# Patient Record
Sex: MALE | Race: WHITE | NOT HISPANIC OR LATINO | Employment: OTHER | URBAN - METROPOLITAN AREA
[De-identification: names, ages, dates, MRNs, and addresses within clinical notes are randomized per-mention and may not be internally consistent; named-entity substitution may affect disease eponyms.]

---

## 2017-04-20 ENCOUNTER — GENERIC CONVERSION - ENCOUNTER (OUTPATIENT)
Dept: OTHER | Facility: OTHER | Age: 67
End: 2017-04-20

## 2017-05-11 ENCOUNTER — TRANSCRIBE ORDERS (OUTPATIENT)
Dept: LAB | Facility: CLINIC | Age: 67
End: 2017-05-11

## 2017-05-11 ENCOUNTER — APPOINTMENT (OUTPATIENT)
Dept: LAB | Facility: CLINIC | Age: 67
End: 2017-05-11

## 2017-05-11 DIAGNOSIS — R53.83 OTHER FATIGUE: ICD-10-CM

## 2017-05-11 DIAGNOSIS — E55.9 VITAMIN D DEFICIENCY: ICD-10-CM

## 2017-05-11 DIAGNOSIS — E78.5 HYPERLIPIDEMIA: ICD-10-CM

## 2017-05-11 DIAGNOSIS — D64.9 ANEMIA: ICD-10-CM

## 2017-05-11 LAB
25(OH)D3 SERPL-MCNC: 35.8 NG/ML (ref 30–100)
ALBUMIN SERPL BCP-MCNC: 3.8 G/DL (ref 3.5–5)
ALP SERPL-CCNC: 102 U/L (ref 46–116)
ALT SERPL W P-5'-P-CCNC: 30 U/L (ref 12–78)
ANION GAP SERPL CALCULATED.3IONS-SCNC: 7 MMOL/L (ref 4–13)
AST SERPL W P-5'-P-CCNC: 21 U/L (ref 5–45)
BILIRUB SERPL-MCNC: 0.76 MG/DL (ref 0.2–1)
BUN SERPL-MCNC: 18 MG/DL (ref 5–25)
CALCIUM SERPL-MCNC: 9.2 MG/DL (ref 8.3–10.1)
CHLORIDE SERPL-SCNC: 106 MMOL/L (ref 100–108)
CHOLEST SERPL-MCNC: 164 MG/DL (ref 50–200)
CO2 SERPL-SCNC: 27 MMOL/L (ref 21–32)
CREAT SERPL-MCNC: 1.1 MG/DL (ref 0.6–1.3)
ERYTHROCYTE [DISTWIDTH] IN BLOOD BY AUTOMATED COUNT: 13.2 % (ref 11.6–15.1)
GFR SERPL CREATININE-BSD FRML MDRD: >60 ML/MIN/1.73SQ M
GLUCOSE P FAST SERPL-MCNC: 90 MG/DL (ref 65–99)
HCT VFR BLD AUTO: 44.3 % (ref 36.5–49.3)
HDLC SERPL-MCNC: 61 MG/DL (ref 40–60)
HGB BLD-MCNC: 14.7 G/DL (ref 12–17)
LDLC SERPL CALC-MCNC: 83 MG/DL (ref 0–100)
MCH RBC QN AUTO: 31.3 PG (ref 26.8–34.3)
MCHC RBC AUTO-ENTMCNC: 33.2 G/DL (ref 31.4–37.4)
MCV RBC AUTO: 94 FL (ref 82–98)
PLATELET # BLD AUTO: 242 THOUSANDS/UL (ref 149–390)
PMV BLD AUTO: 10.3 FL (ref 8.9–12.7)
POTASSIUM SERPL-SCNC: 4.5 MMOL/L (ref 3.5–5.3)
PROT SERPL-MCNC: 7 G/DL (ref 6.4–8.2)
RBC # BLD AUTO: 4.7 MILLION/UL (ref 3.88–5.62)
SODIUM SERPL-SCNC: 140 MMOL/L (ref 136–145)
TRIGL SERPL-MCNC: 98 MG/DL
TSH SERPL DL<=0.05 MIU/L-ACNC: 1.07 UIU/ML (ref 0.36–3.74)
WBC # BLD AUTO: 7.75 THOUSAND/UL (ref 4.31–10.16)

## 2017-05-11 PROCEDURE — 82306 VITAMIN D 25 HYDROXY: CPT

## 2017-05-11 PROCEDURE — 85027 COMPLETE CBC AUTOMATED: CPT

## 2017-05-11 PROCEDURE — 80053 COMPREHEN METABOLIC PANEL: CPT

## 2017-05-11 PROCEDURE — 80061 LIPID PANEL: CPT

## 2017-05-11 PROCEDURE — 36415 COLL VENOUS BLD VENIPUNCTURE: CPT

## 2017-05-11 PROCEDURE — 84443 ASSAY THYROID STIM HORMONE: CPT

## 2017-06-01 ENCOUNTER — ALLSCRIPTS OFFICE VISIT (OUTPATIENT)
Dept: OTHER | Facility: OTHER | Age: 67
End: 2017-06-01

## 2017-10-10 ENCOUNTER — ALLSCRIPTS OFFICE VISIT (OUTPATIENT)
Dept: OTHER | Facility: OTHER | Age: 67
End: 2017-10-10

## 2017-11-21 ENCOUNTER — APPOINTMENT (OUTPATIENT)
Dept: LAB | Facility: CLINIC | Age: 67
End: 2017-11-21
Payer: COMMERCIAL

## 2017-11-21 ENCOUNTER — TRANSCRIBE ORDERS (OUTPATIENT)
Dept: LAB | Facility: CLINIC | Age: 67
End: 2017-11-21

## 2017-11-21 DIAGNOSIS — I10 ESSENTIAL (PRIMARY) HYPERTENSION: ICD-10-CM

## 2017-11-21 DIAGNOSIS — E78.5 HYPERLIPIDEMIA: ICD-10-CM

## 2017-11-21 LAB
ALBUMIN SERPL BCP-MCNC: 3.5 G/DL (ref 3.5–5)
ALP SERPL-CCNC: 94 U/L (ref 46–116)
ALT SERPL W P-5'-P-CCNC: 29 U/L (ref 12–78)
ANION GAP SERPL CALCULATED.3IONS-SCNC: 4 MMOL/L (ref 4–13)
AST SERPL W P-5'-P-CCNC: 21 U/L (ref 5–45)
BILIRUB SERPL-MCNC: 0.71 MG/DL (ref 0.2–1)
BUN SERPL-MCNC: 17 MG/DL (ref 5–25)
CALCIUM SERPL-MCNC: 9.2 MG/DL (ref 8.3–10.1)
CHLORIDE SERPL-SCNC: 106 MMOL/L (ref 100–108)
CHOLEST SERPL-MCNC: 165 MG/DL (ref 50–200)
CO2 SERPL-SCNC: 29 MMOL/L (ref 21–32)
CREAT SERPL-MCNC: 1.09 MG/DL (ref 0.6–1.3)
GFR SERPL CREATININE-BSD FRML MDRD: 71 ML/MIN/1.73SQ M
GLUCOSE P FAST SERPL-MCNC: 95 MG/DL (ref 65–99)
HDLC SERPL-MCNC: 60 MG/DL (ref 40–60)
LDLC SERPL CALC-MCNC: 74 MG/DL (ref 0–100)
POTASSIUM SERPL-SCNC: 4.7 MMOL/L (ref 3.5–5.3)
PROT SERPL-MCNC: 7.1 G/DL (ref 6.4–8.2)
SODIUM SERPL-SCNC: 139 MMOL/L (ref 136–145)
TRIGL SERPL-MCNC: 154 MG/DL

## 2017-11-21 PROCEDURE — 80053 COMPREHEN METABOLIC PANEL: CPT

## 2017-11-21 PROCEDURE — 36415 COLL VENOUS BLD VENIPUNCTURE: CPT

## 2017-11-21 PROCEDURE — 80061 LIPID PANEL: CPT

## 2017-12-01 DIAGNOSIS — E78.5 HYPERLIPIDEMIA: ICD-10-CM

## 2017-12-01 DIAGNOSIS — I10 ESSENTIAL (PRIMARY) HYPERTENSION: ICD-10-CM

## 2017-12-11 ENCOUNTER — ALLSCRIPTS OFFICE VISIT (OUTPATIENT)
Dept: OTHER | Facility: OTHER | Age: 67
End: 2017-12-11

## 2017-12-12 NOTE — PROGRESS NOTES
Assessment    1  Benign hypertensive CKD, 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 chronic kidney disease (403 10) (I12 9)  2  Chronic kidney disease, stage 2 (mild) (585 2) (N18 2)  3  Hyperlipidemia (272 4) (E78 5)  4  Locking of left knee (717 9) (M23 92)    Plan  Essential (primary) hypertension, Hyperlipidemia    · (1) LIPID PANEL, FASTING; Status:Active - Retrospective By Protocol Authorization; Requested [id number];    · (1) TSH; Status:Active - Retrospective By Protocol Authorization; Requestedfor:01Jun2018;   Hyperlipidemia    · (1) COMPREHENSIVE METABOLIC PANEL; Status:Active - Retrospective By ProtocolAuthorization; Requested [id number];   Need for 23-polyvalent pneumococcal polysaccharide vaccine    · Administered: Pneumovax 23 25 MCG/0 5ML Injection Injectable  Screening PSA (prostate specific antigen)    · (1) PSA (SCREEN) (Dx V76 44 Screen for Prostate Cancer); Status:Active - RetrospectiveBy Protocol Authorization; Requested [id number]; Discussion/Summary    1/2  Benign hypertensive kidney disease stage 2 blood pressure and GFR are stable  3  Hyperlipidemia-LDL stable  Continue present medication  4  Locking of left knee  Patient currently asymptomatic and able to ski without issue  I gave him a card for Orthopedics should this worsen  Otherwise we will see him in 6 months for blood work which will also been include a PSA  Pneumovax 23 given today  The patient was counseled regarding diagnostic results,-- instructions for management,-- risk factor reductions,-- prognosis,-- patient and family education,-- impressions,-- risks and benefits of treatment options,-- importance of compliance with treatment  total time of encounter was 25 minutes-- and-- Greater than 50 percent minutes was spent counseling  The treatment plan was reviewed with the patient/guardian   The patient/guardian understands and agrees with the treatment plan      Chief Complaint  no concerns today just stated that he might be due for a vaccination   Patient is here today for follow up of chronic conditions described in HPI  History of Present Illness  The patient is here today for a follow-up visit  His hyperlipidemia has been stable  His LDL goal is <130 mg/dL  the patient is adherent with his medication regimen  His hypertension is primary, but-- stable  the patient is adherent with his medication regimen  -- He denies medication side effects  Symptoms: The patient denies any symptoms currently  Lifestyle and Disease Management:   29-year-old white male for 6 month blood pressure check and review of labs  Overall is doing well  Mentions an issue since March where he notices that his left knee can occasionally lot  He denies any swelling or instability  States he may have hurt it playing competitive volleyball back in March  Has not been playing any competitive volleyball since then  He is still able to ski without any issues  Review of Systems   Constitutional: No fever or chills, feels well, no tiredness, no recent weight gain or weight loss  ENT: no complaints of earache, no hearing loss, no nosebleeds, no nasal discharge, no sore throat, no hoarseness  Cardiovascular: No complaints of slow heart rate, no fast heart rate, no chest pain, no palpitations, no leg claudication, no lower extremity  Respiratory: No complaints of shortness of breath, no wheezing, no cough, no SOB on exertion, no orthopnea or PND  Gastrointestinal: No complaints of abdominal pain, no constipation, no nausea or vomiting, no diarrhea or bloody stools  Genitourinary: No complaints of dysuria, no incontinence, no hesitancy, no nocturia, no genital lesion, no testicular pain  Musculoskeletal: as noted in HPI  Integumentary: no rashes  Neurological: No compliants of headache, no confusion, no convulsions, no numbness or tingling, no dizziness or fainting, no limb weakness, no difficulty walking    Psychiatric: Is not suicidal, no sleep disturbances, no anxiety or depression, no change in personality, no emotional problems  Hematologic/Lymphatic: no tendency for easy bleeding  Active Problems  1  Benign colon polyp (211 3) (K63 5)  2  Encounter for screening for malignant neoplasm of colon (V76 51) (Z12 11)  3  Essential (primary) hypertension (401 9) (I10)  4  Hyperlipidemia (272 4) (E78 5)  5  Need for influenza vaccination (V04 81) (Z23)  6  Need for pneumococcal vaccination (V03 82) (Z23)  7  Screen for colon cancer (V76 51) (Z12 11)  8  Screening for depression (V79 0) (Z13 89)  9  Vitamin D deficiency (268 9) (E55 9)    Past Medical History  1  History of Abnormal blood chemistry (790 6) (R79 9)  2  History of Acute pain of right shoulder (719 41) (M25 511)  3  History of Cough (786 2) (R05)  4  History of Dysuria (788 1) (R30 0)  5  History of Elevated ALT measurement (790 4) (R74 0)  6  History of anemia (V12 3) (Z86 2)  7  History of diarrhea (V12 79) (Z87 898)  8  History of fatigue (V13 89) (Z87 898)  9  History of fever (V13 89) (Z87 898)  10  History of headache (V13 89) (Z87 898)  11  History of prostatitis (V13 89) (Z87 438)  12  History of urethritis (V13 09) (Z87 448)  13  History of urinary tract infection (V13 02) (Z87 440)  14  History of Left ear pain (388 70) (H92 02)  15  History of Need for zoster vaccination (V04 89) (Z23)  16  History of Night sweats (780 8) (R61)  17  History of Occult blood positive stool (792 1) (R19 5)  18  History of Screen for colon cancer (V76 51) (Z12 11)  19  History of Upper respiratory infection (465 9) (J06 9)  20  History of Vasovagal syncope (780 2) (R55)  21  History of Weight loss (783 21) (R63 4)    Surgical History  1  History of Cataract Surgery  2  History of Colonoscopy (Fiberoptic)  3  History of Eye Surgery    Family History  Mother   1  Family history of Atherosclerosis (V17 49)  2  Family history of Chronic Kidney Disease (NKF Classification)  3   Family history of Essential Hypertension  4  Family history of Glaucoma  5  Family history of Macular Degeneration  Father   10  Family history of Family Health Status Of Father -   9  Family history of Glaucoma  Sister   8  Family history of Multiple Sclerosis    Social History     · Being A Social Drinker   · Never A Smoker    Current Meds  1  Multi Vitamin/Minerals TABS; Therapy: (Recorded:86Rtw7719) to Recorded  2  Rosuvastatin Calcium 5 MG Oral Tablet; TAKE 1 TABLET DAILY; Therapy: 71AMP9856 to (654 565 824)  Requested for: 62UNM6122; Last Rx:00Obw5371 Ordered  3  Valsartan-Hydrochlorothiazide 160-12 5 MG Oral Tablet; Take 1 tablet daily; Therapy: 92HVM9495 to (Evaluate:03Jhi0799)  Requested for: 90Jwu3025; Last Rx:82Mqn1480 Ordered  4  Vitamin D3 1000 UNIT Oral Capsule; Therapy: (Recorded:36Dsi6877) to Recorded    Allergies  1  Pentothal KIT  2  Vytorin TABS    Vitals  Vital Signs    Recorded: 04ZQT9729 78:24PI   Systolic 310   Diastolic 72   Height 5 ft 8 in   Weight 162 lb 6 oz   BMI Calculated 24 69   BSA Calculated 1 87       Physical Exam   Constitutional  General appearance: No acute distress, well appearing and well nourished  Eyes  Pupils and irises: Equal, round and reactive to light  Ears, Nose, Mouth, and Throat  Oropharynx: Normal with no erythema, edema, exudate or lesions  Pulmonary  Respiratory effort: No increased work of breathing or signs of respiratory distress  Auscultation of lungs: Clear to auscultation, equal breath sounds bilaterally, no wheezes, no rales, no rhonci  Cardiovascular  Auscultation of heart: Normal rate and rhythm, normal S1 and S2, without murmurs  Examination of extremities for edema and/or varicosities: Normal    Carotid pulses: Normal    Lymphatic  Palpation of lymph nodes in neck: No lymphadenopathy  Musculoskeletal  Gait and station: Normal    Digits and nails: Normal without clubbing or cyanosis     Inspection/palpation of joints, bones, and muscles: Normal  Neurologic  Cranial nerves: Cranial nerves 2-12 intact  Psychiatric  Orientation to person, place and time: Normal    Mood and affect: Normal          Health Management  Encounter for screening for malignant neoplasm of colon   COLONOSCOPY; every 3 years; Last 02ZLN9020; Next Due: 17Aug2019;  Active    Future Appointments    Date/Time Provider Specialty Site   39/06/9403 48:46 AM Rakesh Engle DO Family Medicine [facility]       Signatures   Electronically signed by : Amber Aguillon DO; Dec 11 5030  9:56AM EST                       (Author)    Electronically signed by : Amber Aguillon DO; Dec 11 7832  4:14PM EST                       (Author)

## 2018-01-13 VITALS
DIASTOLIC BLOOD PRESSURE: 64 MMHG | SYSTOLIC BLOOD PRESSURE: 102 MMHG | HEIGHT: 68 IN | WEIGHT: 159 LBS | BODY MASS INDEX: 24.1 KG/M2

## 2018-01-16 NOTE — PROGRESS NOTES
Chief Complaint  pt here flu shot - GMP      Active Problems    1  Acute pain of right shoulder (719 41) (M25 511)   2  Benign colon polyp (211 3) (K63 5)   3  Encounter for screening for malignant neoplasm of colon (V76 51) (Z12 11)   4  Essential (primary) hypertension (401 9) (I10)   5  Hyperlipidemia (272 4) (E78 5)   6  Need for influenza vaccination (V04 81) (Z23)   7  Need for pneumococcal vaccination (V03 82) (Z23)   8  Occult blood positive stool (792 1) (R19 5)   9  Screen for colon cancer (V76 51) (Z12 11)   10  Screening for depression (V79 0) (Z13 89)   11  Vitamin D deficiency (268 9) (E55 9)    Current Meds   1  Multi Vitamin/Minerals TABS; Therapy: (Recorded:79Ztw1255) to Recorded   2  Rosuvastatin Calcium 5 MG Oral Tablet; TAKE 1 TABLET DAILY; Therapy: 13DZI3356 to (22 031775)  Requested for: 80IHW3514; Last   Rx:28Gkv9185 Ordered   3  Valsartan-Hydrochlorothiazide 160-12 5 MG Oral Tablet; Take 1 tablet daily; Therapy: 58OGR1193 to (Evaluate:99Xsu5891)  Requested for: 25Cmt3366; Last   Rx:00Spx4469 Ordered   4  Vitamin D3 1000 UNIT Oral Capsule; Therapy: (Recorded:80Sgn1885) to Recorded    Allergies    1  Pentothal KIT   2   Vytorin TABS    Plan  Need for influenza vaccination    · Fluzone High-Dose 0 5 ML Intramuscular Suspension Prefilled Syringe    Future Appointments    Date/Time Provider Specialty Site   94/57/9983 25:28 AM Niko Sullivan DO Family Medicine [facility]     Signatures   Electronically signed by : Poly Meyers DO; Oct 10 2992  4:58PM EST                       (Author)

## 2018-01-18 NOTE — RESULT NOTES
Verified Results  (1) TISSUE EXAM 32Adv2387 09:39AM Savanah Sears     Test Name Result Flag Reference   LAB AP CASE REPORT (Report)     Surgical Pathology Report             Case: [id number]                   Authorizing Provider: Brittany Guerrero MD      Collected:      08/17/2016 0939        Ordering Location:   [address] [place] [address] End    Received:      08/18/2016 [facility]                            Pathologist:      Joanna Moulton MD                                Specimens:  A) - Polyp, Colorectal, cecal polyp hot snare                             B) - Polyp, Colorectal, cecal polyp hot snare                             C) - Polyp, Colorectal, ascending colon polyp hot snare                        D) - Polyp, Colorectal, transverse colon polyp hot snare   LAB AP FINAL DIAGNOSIS (Report)     A  Polyp, Colorectal, cecal polyp hot snare:  -Tubular adenoma  -No evidence of high grade dysplasia or malignancy seen  B  Polyp, Colorectal, cecal polyp hot snare:  -Tubular adenoma  -No evidence of high grade dysplasia or malignancy seen  C  Polyp, Colorectal, ascending colon polyp hot snare:  Adenomatous polyp/ Tubular adenoma   -No evidence of high grade dysplasia or malignancy  D  Polyp, Colorectal, transverse colon polyp hot snare:  -Hyperplastic polyp   -No evidence of adenomatous change or malignancy  Electronically signed by Joanna Moulton MD on 8/22/2016 at 2:07 PM   LAB AP NOTE      Interpretation performed at [facility], [address] [facility]   LAB 0 [facility] (Report)     These tests were developed and their performance characteristics   determined by [facility] or [facility]  They may not be cleared or approved by the [place]  Food and   Drug Administration  The FDA has determined that such clearance or   approval is not necessary  These tests are used for clinical purposes     They should not be regarded as investigational or for research  This   laboratory has been approved by [place], designated as a high-complexity   laboratory and is qualified to perform these tests  LAB AP GROSS DESCRIPTION (Report)     A  The specimen is received in formalin, labeled with the patient's name   and hospital number, and is designated cecal polyp  The specimen   consists of a single tan soft tissue fragment measuring 1 x 0 3 x 0 1 cm  Entirely submitted  One cassette  B  The specimen is received in formalin, labeled with the patient's name   and hospital number, and is designated cecal polyp  The specimen   consists of a single tan soft tissue fragment measuring 0 5 cm  Entirely   submitted  One cassette  C  The specimen is received in formalin, labeled with the patient's name   and hospital number, and is designated ascending colon polyp  The   specimen consists of 2 tan soft tissue fragments measuring 0 4 and 0 5 cm  Entirely submitted  One cassette  D  The specimen is received in formalin, labeled with the patient's name   and hospital number, and is designated transverse colon polyp  The   specimen consists of a single tan soft tissue/polypoid fragment measuring   0 8 cm  Entirely submitted  One cassette  Note: The estimated total formalin fixation time based upon information   provided by the submitting clinician and the standard processing schedule   is 42 75 hours      [facility]   LAB AP CLINICAL INFORMATION      Cecal polyp hot snare injected with 9 ml methyline blue prior/ clip applied post polypectomy   Cecal polyp hot snare injected with 9 ml methyline blue prior/ clip applied post polypectomy

## 2018-01-22 VITALS
SYSTOLIC BLOOD PRESSURE: 118 MMHG | DIASTOLIC BLOOD PRESSURE: 72 MMHG | BODY MASS INDEX: 24.61 KG/M2 | WEIGHT: 162.38 LBS | HEIGHT: 68 IN

## 2018-05-15 ENCOUNTER — TRANSCRIBE ORDERS (OUTPATIENT)
Dept: LAB | Facility: CLINIC | Age: 68
End: 2018-05-15

## 2018-06-01 DIAGNOSIS — E78.5 HYPERLIPIDEMIA: ICD-10-CM

## 2018-06-01 DIAGNOSIS — Z12.5 ENCOUNTER FOR SCREENING FOR MALIGNANT NEOPLASM OF PROSTATE: ICD-10-CM

## 2018-06-01 DIAGNOSIS — I10 ESSENTIAL (PRIMARY) HYPERTENSION: ICD-10-CM

## 2018-06-04 ENCOUNTER — APPOINTMENT (OUTPATIENT)
Dept: LAB | Facility: CLINIC | Age: 68
End: 2018-06-04
Payer: COMMERCIAL

## 2018-06-04 DIAGNOSIS — E78.5 HYPERLIPIDEMIA: ICD-10-CM

## 2018-06-04 DIAGNOSIS — I10 ESSENTIAL (PRIMARY) HYPERTENSION: ICD-10-CM

## 2018-06-04 DIAGNOSIS — Z12.5 ENCOUNTER FOR SCREENING FOR MALIGNANT NEOPLASM OF PROSTATE: ICD-10-CM

## 2018-06-04 LAB
ALBUMIN SERPL BCP-MCNC: 3.6 G/DL (ref 3.5–5)
ALP SERPL-CCNC: 93 U/L (ref 46–116)
ALT SERPL W P-5'-P-CCNC: 34 U/L (ref 12–78)
ANION GAP SERPL CALCULATED.3IONS-SCNC: 8 MMOL/L (ref 4–13)
AST SERPL W P-5'-P-CCNC: 26 U/L (ref 5–45)
BILIRUB SERPL-MCNC: 0.78 MG/DL (ref 0.2–1)
BUN SERPL-MCNC: 17 MG/DL (ref 5–25)
CALCIUM SERPL-MCNC: 8.9 MG/DL (ref 8.3–10.1)
CHLORIDE SERPL-SCNC: 106 MMOL/L (ref 100–108)
CHOLEST SERPL-MCNC: 167 MG/DL (ref 50–200)
CO2 SERPL-SCNC: 26 MMOL/L (ref 21–32)
CREAT SERPL-MCNC: 1.21 MG/DL (ref 0.6–1.3)
GFR SERPL CREATININE-BSD FRML MDRD: 62 ML/MIN/1.73SQ M
GLUCOSE P FAST SERPL-MCNC: 92 MG/DL (ref 65–99)
HDLC SERPL-MCNC: 51 MG/DL (ref 40–60)
LDLC SERPL CALC-MCNC: 93 MG/DL (ref 0–100)
NONHDLC SERPL-MCNC: 116 MG/DL
POTASSIUM SERPL-SCNC: 3.8 MMOL/L (ref 3.5–5.3)
PROT SERPL-MCNC: 7 G/DL (ref 6.4–8.2)
PSA SERPL-MCNC: 2.3 NG/ML (ref 0–4)
SODIUM SERPL-SCNC: 140 MMOL/L (ref 136–145)
TRIGL SERPL-MCNC: 117 MG/DL
TSH SERPL DL<=0.05 MIU/L-ACNC: 1.01 UIU/ML (ref 0.36–3.74)

## 2018-06-04 PROCEDURE — 84443 ASSAY THYROID STIM HORMONE: CPT

## 2018-06-04 PROCEDURE — 80053 COMPREHEN METABOLIC PANEL: CPT

## 2018-06-04 PROCEDURE — G0103 PSA SCREENING: HCPCS

## 2018-06-04 PROCEDURE — 80061 LIPID PANEL: CPT

## 2018-06-04 PROCEDURE — 36415 COLL VENOUS BLD VENIPUNCTURE: CPT

## 2018-06-11 RX ORDER — IBUPROFEN 800 MG
400 TABLET ORAL
COMMUNITY

## 2018-06-11 RX ORDER — ROSUVASTATIN CALCIUM 5 MG/1
1 TABLET, COATED ORAL DAILY
COMMUNITY
Start: 2016-10-17 | End: 2018-06-12 | Stop reason: SDUPTHER

## 2018-06-12 ENCOUNTER — OFFICE VISIT (OUTPATIENT)
Dept: FAMILY MEDICINE CLINIC | Facility: CLINIC | Age: 68
End: 2018-06-12
Payer: COMMERCIAL

## 2018-06-12 VITALS
SYSTOLIC BLOOD PRESSURE: 100 MMHG | HEIGHT: 66 IN | DIASTOLIC BLOOD PRESSURE: 70 MMHG | BODY MASS INDEX: 25.68 KG/M2 | WEIGHT: 159.8 LBS

## 2018-06-12 DIAGNOSIS — E78.00 PURE HYPERCHOLESTEROLEMIA: Primary | ICD-10-CM

## 2018-06-12 DIAGNOSIS — N18.2 CHRONIC KIDNEY DISEASE, STAGE 2 (MILD): ICD-10-CM

## 2018-06-12 DIAGNOSIS — E78.00 PURE HYPERCHOLESTEROLEMIA: ICD-10-CM

## 2018-06-12 DIAGNOSIS — I12.9 BENIGN HYPERTENSIVE CKD, STAGE 1-4 OR UNSPECIFIED CHRONIC KIDNEY DISEASE: Primary | ICD-10-CM

## 2018-06-12 PROCEDURE — 1160F RVW MEDS BY RX/DR IN RCRD: CPT | Performed by: FAMILY MEDICINE

## 2018-06-12 PROCEDURE — 99213 OFFICE O/P EST LOW 20 MIN: CPT | Performed by: FAMILY MEDICINE

## 2018-06-12 PROCEDURE — 3725F SCREEN DEPRESSION PERFORMED: CPT | Performed by: FAMILY MEDICINE

## 2018-06-12 PROCEDURE — 1101F PT FALLS ASSESS-DOCD LE1/YR: CPT | Performed by: FAMILY MEDICINE

## 2018-06-12 PROCEDURE — 1036F TOBACCO NON-USER: CPT | Performed by: FAMILY MEDICINE

## 2018-06-12 PROCEDURE — 3008F BODY MASS INDEX DOCD: CPT | Performed by: FAMILY MEDICINE

## 2018-06-12 RX ORDER — ROSUVASTATIN CALCIUM 5 MG/1
5 TABLET, COATED ORAL DAILY
Qty: 90 TABLET | Refills: 3 | Status: SHIPPED | OUTPATIENT
Start: 2018-06-12 | End: 2018-06-18 | Stop reason: SDUPTHER

## 2018-06-12 NOTE — PROGRESS NOTES
Assessment/Plan:    Patient's blood pressure and CKD are stable  Recheck in 6 months  Patient will undergo an updated CKD profile in December  Patient's hyperlipidemia is under control  Continue statin  Patient to get flu shot in the fall  Insure kit given     Diagnoses and all orders for this visit:    Benign hypertensive CKD, 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 chronic kidney disease    Chronic kidney disease, stage 2 (mild)  -     Comprehensive metabolic panel; Future  -     Microalbumin / creatinine urine ratio  -     PTH, intact; Future  -     Protein electrophoresis, serum; Future    Pure hypercholesterolemia  -     Lipid panel; Future    Other orders  -     Cholecalciferol (VITAMIN D3) 1000 units CAPS; Take by mouth  -     rosuvastatin (CRESTOR) 5 mg tablet; Take 1 tablet by mouth daily        There are no Patient Instructions on file for this visit  Subjective:        Patient ID: Lucila Kingsley is a 79 y o  male  Chief Complaint   Patient presents with    Follow-up     6 months, CHOL       Patient here for 6 month check regarding blood pressure and labs  Overall feels well with no new complaint        The following portions of the patient's history were reviewed and updated as appropriate: past medical history, past surgical history and problem list       Review of Systems   Constitutional: Negative  Respiratory: Negative  Cardiovascular: Negative  Neurological: Negative  Objective:  /70 (BP Location: Left arm, Patient Position: Sitting, Cuff Size: Standard)   Ht 5' 6" (1 676 m)   Wt 72 5 kg (159 lb 12 8 oz)   BMI 25 79 kg/m²        Physical Exam   Constitutional: He is oriented to person, place, and time  No distress  HENT:   Head: Normocephalic  Cardiovascular: Normal rate, regular rhythm and normal heart sounds  Pulmonary/Chest: Breath sounds normal    Musculoskeletal: He exhibits no edema  Neurological: He is alert and oriented to person, place, and time

## 2018-06-18 DIAGNOSIS — E78.00 PURE HYPERCHOLESTEROLEMIA: ICD-10-CM

## 2018-06-18 RX ORDER — ROSUVASTATIN CALCIUM 5 MG/1
5 TABLET, COATED ORAL DAILY
Qty: 90 TABLET | Refills: 2 | Status: SHIPPED | OUTPATIENT
Start: 2018-06-18 | End: 2020-01-03 | Stop reason: SDUPTHER

## 2018-07-24 ENCOUNTER — TELEPHONE (OUTPATIENT)
Dept: FAMILY MEDICINE CLINIC | Facility: CLINIC | Age: 68
End: 2018-07-24

## 2018-07-24 NOTE — TELEPHONE ENCOUNTER
Patient called and stated he currently takes valsartan, due to the recall asking for a new medication to be sent to [facility]

## 2018-07-25 DIAGNOSIS — I10 ESSENTIAL HYPERTENSION: Primary | ICD-10-CM

## 2018-07-25 RX ORDER — LOSARTAN POTASSIUM AND HYDROCHLOROTHIAZIDE 12.5; 5 MG/1; MG/1
1 TABLET ORAL DAILY
Qty: 30 TABLET | Refills: 5 | Status: SHIPPED | OUTPATIENT
Start: 2018-07-25 | End: 2018-08-07 | Stop reason: SDUPTHER

## 2018-07-25 NOTE — TELEPHONE ENCOUNTER
Toe patient to stop his valsartan and that I sent a replacement medication in  He should monitor his blood pressure let us know if it is too low or too high

## 2018-08-07 DIAGNOSIS — I10 ESSENTIAL HYPERTENSION: ICD-10-CM

## 2018-08-07 RX ORDER — LOSARTAN POTASSIUM AND HYDROCHLOROTHIAZIDE 12.5; 5 MG/1; MG/1
1 TABLET ORAL DAILY
Qty: 90 TABLET | Refills: 1 | Status: SHIPPED | OUTPATIENT
Start: 2018-08-07 | End: 2019-04-15

## 2018-08-07 NOTE — TELEPHONE ENCOUNTER
Patient called and stated that he wanted to losartan to go to [facility], but it was sent to [place]  Please authorize script

## 2018-08-29 ENCOUNTER — TELEPHONE (OUTPATIENT)
Dept: FAMILY MEDICINE CLINIC | Facility: CLINIC | Age: 68
End: 2018-08-29

## 2018-08-29 NOTE — TELEPHONE ENCOUNTER
Tell patient temporarily to cut the tablet in half even know it is a 2 medication pill  And call us back Friday with blood pressure readings

## 2018-08-31 DIAGNOSIS — I10 ESSENTIAL HYPERTENSION: Primary | ICD-10-CM

## 2018-08-31 NOTE — TELEPHONE ENCOUNTER
Spoke to patient regarding his blood pressure readings  He would like to change the Losartan HCTZ to Losartan 50 mg daily  He is aware that if his readings continue to run low that he can take 1/2 tablet daily  Please authorize script

## 2018-09-03 RX ORDER — LOSARTAN POTASSIUM 50 MG/1
50 TABLET ORAL DAILY
Qty: 30 TABLET | Refills: 5 | Status: SHIPPED | OUTPATIENT
Start: 2018-09-03 | End: 2018-10-18 | Stop reason: SDUPTHER

## 2018-10-02 ENCOUNTER — CLINICAL SUPPORT (OUTPATIENT)
Dept: FAMILY MEDICINE CLINIC | Facility: CLINIC | Age: 68
End: 2018-10-02
Payer: COMMERCIAL

## 2018-10-02 DIAGNOSIS — Z23 NEED FOR INFLUENZA VACCINATION: Primary | ICD-10-CM

## 2018-10-02 PROCEDURE — G0008 ADMIN INFLUENZA VIRUS VAC: HCPCS

## 2018-10-02 PROCEDURE — 90662 IIV NO PRSV INCREASED AG IM: CPT

## 2018-10-02 PROCEDURE — 4040F PNEUMOC VAC/ADMIN/RCVD: CPT

## 2018-10-08 ENCOUNTER — TELEPHONE (OUTPATIENT)
Dept: FAMILY MEDICINE CLINIC | Facility: CLINIC | Age: 68
End: 2018-10-08

## 2018-10-18 DIAGNOSIS — I10 ESSENTIAL HYPERTENSION: ICD-10-CM

## 2018-10-18 RX ORDER — LOSARTAN POTASSIUM 50 MG/1
50 TABLET ORAL DAILY
Qty: 90 TABLET | Refills: 1 | Status: SHIPPED | OUTPATIENT
Start: 2018-10-18 | End: 2019-04-15 | Stop reason: SDUPTHER

## 2018-10-18 NOTE — TELEPHONE ENCOUNTER
Patient called script line asking for a refill for [facility] to be [facility]    Please authorize script

## 2018-12-11 ENCOUNTER — APPOINTMENT (OUTPATIENT)
Dept: LAB | Facility: CLINIC | Age: 68
End: 2018-12-11
Payer: COMMERCIAL

## 2018-12-11 DIAGNOSIS — N18.2 CHRONIC KIDNEY DISEASE, STAGE 2 (MILD): ICD-10-CM

## 2018-12-11 DIAGNOSIS — E78.00 PURE HYPERCHOLESTEROLEMIA: ICD-10-CM

## 2018-12-11 LAB
ALBUMIN SERPL BCP-MCNC: 3.5 G/DL (ref 3.5–5)
ALP SERPL-CCNC: 100 U/L (ref 46–116)
ALT SERPL W P-5'-P-CCNC: 33 U/L (ref 12–78)
ANION GAP SERPL CALCULATED.3IONS-SCNC: 7 MMOL/L (ref 4–13)
AST SERPL W P-5'-P-CCNC: 25 U/L (ref 5–45)
BILIRUB SERPL-MCNC: 0.59 MG/DL (ref 0.2–1)
BUN SERPL-MCNC: 18 MG/DL (ref 5–25)
CALCIUM SERPL-MCNC: 9.5 MG/DL (ref 8.3–10.1)
CHLORIDE SERPL-SCNC: 108 MMOL/L (ref 100–108)
CHOLEST SERPL-MCNC: 157 MG/DL (ref 50–200)
CO2 SERPL-SCNC: 23 MMOL/L (ref 21–32)
CREAT SERPL-MCNC: 1.19 MG/DL (ref 0.6–1.3)
CREAT UR-MCNC: 110 MG/DL
GFR SERPL CREATININE-BSD FRML MDRD: 63 ML/MIN/1.73SQ M
GLUCOSE P FAST SERPL-MCNC: 93 MG/DL (ref 65–99)
HDLC SERPL-MCNC: 53 MG/DL (ref 40–60)
LDLC SERPL CALC-MCNC: 86 MG/DL (ref 0–100)
MICROALBUMIN UR-MCNC: 31.8 MG/L (ref 0–20)
MICROALBUMIN/CREAT 24H UR: 29 MG/G CREATININE (ref 0–30)
NONHDLC SERPL-MCNC: 104 MG/DL
POTASSIUM SERPL-SCNC: 4.3 MMOL/L (ref 3.5–5.3)
PROT SERPL-MCNC: 6.9 G/DL (ref 6.4–8.2)
PTH-INTACT SERPL-MCNC: 46.2 PG/ML (ref 18.4–80.1)
SODIUM SERPL-SCNC: 138 MMOL/L (ref 136–145)
TRIGL SERPL-MCNC: 92 MG/DL

## 2018-12-11 PROCEDURE — 83970 ASSAY OF PARATHORMONE: CPT

## 2018-12-11 PROCEDURE — 84165 PROTEIN E-PHORESIS SERUM: CPT | Performed by: PATHOLOGY

## 2018-12-11 PROCEDURE — 82570 ASSAY OF URINE CREATININE: CPT | Performed by: FAMILY MEDICINE

## 2018-12-11 PROCEDURE — 80061 LIPID PANEL: CPT

## 2018-12-11 PROCEDURE — 84165 PROTEIN E-PHORESIS SERUM: CPT

## 2018-12-11 PROCEDURE — 36415 COLL VENOUS BLD VENIPUNCTURE: CPT

## 2018-12-11 PROCEDURE — 80053 COMPREHEN METABOLIC PANEL: CPT

## 2018-12-11 PROCEDURE — 82043 UR ALBUMIN QUANTITATIVE: CPT | Performed by: FAMILY MEDICINE

## 2018-12-12 LAB
ALBUMIN SERPL ELPH-MCNC: 3.97 G/DL (ref 3.5–5)
ALBUMIN SERPL ELPH-MCNC: 60.1 % (ref 52–65)
ALPHA1 GLOB SERPL ELPH-MCNC: 0.29 G/DL (ref 0.1–0.4)
ALPHA1 GLOB SERPL ELPH-MCNC: 4.4 % (ref 2.5–5)
ALPHA2 GLOB SERPL ELPH-MCNC: 0.77 G/DL (ref 0.4–1.2)
ALPHA2 GLOB SERPL ELPH-MCNC: 11.7 % (ref 7–13)
BETA GLOB ABNORMAL SERPL ELPH-MCNC: 0.44 G/DL (ref 0.4–0.8)
BETA1 GLOB SERPL ELPH-MCNC: 6.7 % (ref 5–13)
BETA2 GLOB SERPL ELPH-MCNC: 5.1 % (ref 2–8)
BETA2+GAMMA GLOB SERPL ELPH-MCNC: 0.34 G/DL (ref 0.2–0.5)
GAMMA GLOB ABNORMAL SERPL ELPH-MCNC: 0.79 G/DL (ref 0.5–1.6)
GAMMA GLOB SERPL ELPH-MCNC: 12 % (ref 12–22)
IGG/ALB SER: 1.51 {RATIO} (ref 1.1–1.8)
PROT PATTERN SERPL ELPH-IMP: NORMAL
PROT SERPL-MCNC: 6.6 G/DL (ref 6.4–8.2)

## 2018-12-20 ENCOUNTER — OFFICE VISIT (OUTPATIENT)
Dept: FAMILY MEDICINE CLINIC | Facility: CLINIC | Age: 68
End: 2018-12-20
Payer: COMMERCIAL

## 2018-12-20 VITALS
BODY MASS INDEX: 24.77 KG/M2 | HEIGHT: 68 IN | DIASTOLIC BLOOD PRESSURE: 60 MMHG | SYSTOLIC BLOOD PRESSURE: 112 MMHG | WEIGHT: 163.4 LBS

## 2018-12-20 DIAGNOSIS — E78.00 PURE HYPERCHOLESTEROLEMIA: ICD-10-CM

## 2018-12-20 DIAGNOSIS — Z00.00 HEALTH CARE MAINTENANCE: Primary | ICD-10-CM

## 2018-12-20 DIAGNOSIS — Z12.5 ENCOUNTER FOR SCREENING FOR MALIGNANT NEOPLASM OF PROSTATE: ICD-10-CM

## 2018-12-20 DIAGNOSIS — N18.2 CHRONIC KIDNEY DISEASE, STAGE 2 (MILD): ICD-10-CM

## 2018-12-20 DIAGNOSIS — I12.9 BENIGN HYPERTENSIVE CKD, STAGE 1-4 OR UNSPECIFIED CHRONIC KIDNEY DISEASE: ICD-10-CM

## 2018-12-20 PROCEDURE — 1036F TOBACCO NON-USER: CPT | Performed by: FAMILY MEDICINE

## 2018-12-20 PROCEDURE — 1125F AMNT PAIN NOTED PAIN PRSNT: CPT | Performed by: FAMILY MEDICINE

## 2018-12-20 PROCEDURE — 99213 OFFICE O/P EST LOW 20 MIN: CPT | Performed by: FAMILY MEDICINE

## 2018-12-20 PROCEDURE — 3008F BODY MASS INDEX DOCD: CPT | Performed by: FAMILY MEDICINE

## 2018-12-20 PROCEDURE — 1160F RVW MEDS BY RX/DR IN RCRD: CPT | Performed by: FAMILY MEDICINE

## 2018-12-20 PROCEDURE — G0438 PPPS, INITIAL VISIT: HCPCS | Performed by: FAMILY MEDICINE

## 2018-12-20 PROCEDURE — 1170F FXNL STATUS ASSESSED: CPT | Performed by: FAMILY MEDICINE

## 2018-12-20 NOTE — PROGRESS NOTES
Assessment/Plan:    Annual Medicare wellness completed  Patient has living will and power   Please see 2nd problem regarding medical issues  Diagnoses and all orders for this visit:    Health care maintenance    Benign hypertensive CKD, 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 chronic kidney disease  -     Comprehensive metabolic panel; Future  -     TSH, 3rd generation; Future    Chronic kidney disease, stage 2 (mild)    Pure hypercholesterolemia  -     Comprehensive metabolic panel; Future  -     Lipid panel; Future    Encounter for screening for malignant neoplasm of prostate  -     PSA, Total Screen; Future        1  Health care maintenance     2  Benign hypertensive CKD, 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 chronic kidney disease  Comprehensive metabolic panel    TSH, 3rd generation   3  Chronic kidney disease, stage 2 (mild)     4  Pure hypercholesterolemia  Comprehensive metabolic panel    Lipid panel   5  Encounter for screening for malignant neoplasm of prostate  PSA, Total Screen       Health Maintenance Due   Topic Date Due    Medicare Annual Wellness Visit (AWV)  1950               Subjective:          HPI:  Jessica Lindquist is a 79 y o  male here for his Initial Wellness Visit      Reviewed Updated [facility] Prior Wellness Visits: N/A          Patient Active Problem List   Diagnosis    Benign hypertensive CKD, 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 chronic kidney disease    Chronic kidney disease, stage 2 (mild)    Hyperlipidemia    Vitamin D deficiency     Past Medical History:   Diagnosis Date    Acute pain of right shoulder     [id number] RESOLVED    Anemia     [id number] RESOLVED    Hyperlipemia     Hypertension      Past Surgical History:   Procedure Laterality Date    BASAL CELL CARCINOMA EXCISION  11/03/2018    on back    CATARACT EXTRACTION Right     EYE SURGERY      right     HI COLONOSCOPY FLX DX W/COLLJ SPEC WHEN PFRMD N/A 8/17/2016    Procedure: COLONOSCOPY;  Surgeon: Kole Fair MD;  Location: [facility];  Service: Gastroenterology     Family History   Problem Relation Age of Onset    Other Mother         ATHEROSCLEROSIS    Kidney disease Mother         CHRONIC     Hypertension Mother         ESSENTIAL    Glaucoma Mother     Macular degeneration Mother     Glaucoma Father     Multiple sclerosis Sister      History   Smoking Status    Never Smoker   Smokeless Tobacco    Never Used     History   Alcohol Use    Yes     Comment: social      History   Drug use: Unknown       Current Outpatient Prescriptions   Medication Sig Dispense Refill    Cholecalciferol (VITAMIN D3) 1000 units CAPS Take by mouth      losartan (COZAAR) 50 mg tablet Take 1 tablet (50 mg total) by mouth daily 90 tablet 1    Multiple Vitamins-Minerals (MULTIVITAMIN WITH MINERALS) tablet Take 1 tablet by mouth   rosuvastatin (CRESTOR) 5 mg tablet Take 1 tablet (5 mg total) by mouth daily 90 tablet 2    ezetimibe-simvastatin (VYTORIN) 10-10 mg per tablet Take 1 tablet by mouth daily   losartan-hydrochlorothiazide (HYZAAR) 50-12 5 mg per tablet Take 1 tablet by mouth daily (Patient not taking: Reported on 12/20/2018 ) 90 tablet 1     No current facility-administered medications for this visit        Allergies   Allergen Reactions    Ezetimibe-Simvastatin      Annotation - [id number]: myalgia    Other      Pentothal KIT     Immunization History   Administered Date(s) Administered    Influenza Quadrivalent, 6-35 Months IM 11/17/2015    Influenza Split High Dose Preservative Free IM 10/10/2017    Influenza TIV (IM) 09/26/2012, 10/02/2014    Influenza, high dose seasonal 0 5 mL 10/02/2018    Pneumococcal Conjugate 13-Valent 05/16/2016    Pneumococcal Polysaccharide PPV23 12/11/2017    Td (adult), adsorbed 05/25/2015    Tdap 05/05/2010    Zoster 04/30/2015       Patient Care Team:  Vy Cornejo DO as PCP - General        Medicare Screening Tests and Risk Assessments:  [unfilled]   [unfilled]  No exam data present            Objective:  /60 (BP Location: Left arm, Patient Position: Sitting, Cuff Size: Standard)   Ht 5' 7 5" (1 715 m)   Wt 74 1 kg (163 lb 6 4 oz)   BMI 25 21 kg/m²

## 2018-12-20 NOTE — PROGRESS NOTES
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 chronic kidney disease    Relevant Orders    Comprehensive metabolic panel    TSH, 3rd generation    Chronic kidney disease, stage 2 (mild)    Hyperlipidemia    Relevant Orders    Comprehensive metabolic panel    Lipid panel      Other Visit Diagnoses     Health care maintenance    -  Primary    Encounter for screening for malignant neoplasm of prostate        Relevant Orders    PSA, Total Screen        Health Maintenance Due   Topic Date Due    Medicare Annual Wellness Visit (AWV)  1950         HPI:  Patient Active Problem List   Diagnosis    Benign hypertensive CKD, 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 chronic kidney disease    Chronic kidney disease, stage 2 (mild)    Hyperlipidemia    Vitamin D deficiency     Past Medical History:   Diagnosis Date    Acute pain of right shoulder     [id number] RESOLVED    Anemia     [id number] RESOLVED    Hyperlipemia     Hypertension      Past Surgical History:   Procedure Laterality Date    BASAL CELL CARCINOMA EXCISION  11/03/2018    on back    CATARACT EXTRACTION Right     EYE SURGERY      right     [place] COLONOSCOPY FLX DX W/COLLJ SPEC WHEN PFRMD N/A 8/17/2016    Procedure: COLONOSCOPY;  Surgeon: Rosa Casas MD;  Location: [facility];   Service: Gastroenterology     Family History   Problem Relation Age of Onset    Other Mother         ATHEROSCLEROSIS    Kidney disease Mother         CHRONIC     Hypertension Mother         ESSENTIAL    Glaucoma Mother     Macular degeneration Mother     Glaucoma Father     Multiple sclerosis Sister      History   Smoking Status    Never Smoker   Smokeless Tobacco    Never Used     History   Alcohol Use    Yes     Comment: social      History   Drug use: Unknown         Current Outpatient Prescriptions   Medication Sig Dispense Refill    Cholecalciferol (VITAMIN D3) 1000 units CAPS Take by mouth      losartan (COZAAR) 50 mg tablet Take 1 tablet (50 mg total) by mouth daily 90 tablet 1    Multiple Vitamins-Minerals (MULTIVITAMIN WITH MINERALS) tablet Take 1 tablet by mouth   rosuvastatin (CRESTOR) 5 mg tablet Take 1 tablet (5 mg total) by mouth daily 90 tablet 2    ezetimibe-simvastatin (VYTORIN) 10-10 mg per tablet Take 1 tablet by mouth daily   losartan-hydrochlorothiazide (HYZAAR) 50-12 5 mg per tablet Take 1 tablet by mouth daily (Patient not taking: Reported on 12/20/2018 ) 90 tablet 1     No current facility-administered medications for this visit  Allergies   Allergen Reactions    Ezetimibe-Simvastatin      Annotation - [id number]: myalgia    Other      Pentothal KIT     Immunization History   Administered Date(s) Administered    Influenza Quadrivalent, 6-35 Months IM 11/17/2015    Influenza Split High Dose Preservative Free IM 10/10/2017    Influenza TIV (IM) 09/26/2012, 10/02/2014    Influenza, high dose seasonal 0 5 mL 10/02/2018    Pneumococcal Conjugate 13-Valent 05/16/2016    Pneumococcal Polysaccharide PPV23 12/11/2017    Td (adult), adsorbed 05/25/2015    Tdap 05/05/2010    Zoster 04/30/2015       Patient Care Team:  Willa Solorio DO as PCP - General    Medicare Screening Tests and Risk Assessments:  Philly Sterling is here for his Initial Wellness visit  Last Medicare Wellness visit information reviewed, patient interviewed and updates made to the record today  Health Risk Assessment:  Patient rates overall health as very good  Patient feels that their physical health rating is Same  Eyesight was rated as Same  Hearing was rated as Same  Patient feels that their emotional and mental health rating is Same  Pain experienced by patient in the last 7 days has been None  Patient states that he has experienced no weight loss or gain in last 6 months  Emotional/Mental Health:  Patient has been feeling nervous/anxious      PHQ-9 Depression Screening:    Frequency of the following problems over the past two weeks:      1  Little interest or pleasure in doing things: 0 - not at all      2  Feeling down, depressed, or hopeless: 0 - not at all  PHQ-2 Score: 0          Broken Bones/Falls: Fall Risk Assessment:    In the past year, patient has experienced: No history of falling in past year          Bladder/Bowel:  Patient has not leaked urine accidently in the last six months  Patient reports no loss of bowel control  Immunizations:  Patient has had a flu vaccination within the last year  Patient has received a pneumonia shot  Patient has received a shingles shot  Patient has received tetanus/diphtheria shot  Home Safety:  Patient does not have trouble with stairs inside or outside of their home  Patient currently reports that there are safety hazards present in home , working smoke alarms, working carbon monoxide detectors  Preventative Screenings:   prostate cancer screen performed, colon cancer screen completed, cholesterol screen completed, glaucoma eye exam completed,     Nutrition:  Current diet: Regular with servings of the following:    Medications:  Patient is currently taking over-the-counter supplements  List of OTC medications includes: vitamin D  Patient is able to manage medications  Lifestyle Choices:  Patient reports no tobacco use  Patient has not smoked or used tobacco in the past   Patient reports alcohol use  Alcohol use per week: 8-10 per week  Patient drives a vehicle  Patient wears seat belt  Current level of exercise of physical activity described by patient as: playing golf, sking, hicking          Activities of Daily Living:  Can get out of bed by his or her self, able to dress self, able to make own meals, able to do own shopping, able to bathe self, can do own laundry/housekeeping, can manage own money, pay bills and track expenses    Previous Hospitalizations:  No hospitalization or ED visit in past 12 months        Advanced Directives:  Patient has decided on a power of   Patient has spoken to designated power of   Patient has completed advanced directive  Preventative Screening/Counseling:      Cardiovascular:      General: Screening Current          Diabetes:      General: Screening Current          Colorectal Cancer:      General: Screening Current          Prostate Cancer:      General: Screening Current          Osteoporosis:      General: Screening Not Indicated          AAA:      General: Screening Not Indicated          Glaucoma:      General: Screening Current          HIV:      General: Screening Not Indicated          Hepatitis C:      General: Screening Not Indicated        Advanced Directives:   Patient has living will for healthcare, has durable POA for healthcare, patient has an advanced directive  Immunizations:      Influenza: Influenza UTD This Year      Pneumococcal: Risks & Benefits Discussed      Shingrix: Risks & Benefits Discussed            No exam data present    Physical Exam:  Review of Systems   Gastrointestinal: Negative for bowel incontinence  Psychiatric/Behavioral: The patient is not nervous/anxious  Vitals:    12/20/18 1006   BP: 112/60   BP Location: Left arm   Patient Position: Sitting   Cuff Size: Standard   Weight: 74 1 kg (163 lb 6 4 oz)   Height: 5' 7 5" (1 715 m)   Body mass index is 25 21 kg/m²      Physical Exam

## 2018-12-20 NOTE — PROGRESS NOTES
Assessment/Plan:    Patient's home blood pressures and blood pressure here today are quite good  Patient's GFR and creatinine are stable  Sugar is good  CKD labs were good  LDL cholesterol at goal on med  Recheck 6 months with repeat labs  Immunizations reviewed  Diagnoses and all orders for this visit:    Health care maintenance    Benign hypertensive CKD, 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 chronic kidney disease  -     Comprehensive metabolic panel; Future  -     TSH, 3rd generation; Future    Chronic kidney disease, stage 2 (mild)    Pure hypercholesterolemia  -     Comprehensive metabolic panel; Future  -     Lipid panel; Future    Encounter for screening for malignant neoplasm of prostate  -     PSA, Total Screen; Future        1  Health care maintenance     2  Benign hypertensive CKD, 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 chronic kidney disease  Comprehensive metabolic panel    TSH, 3rd generation   3  Chronic kidney disease, stage 2 (mild)     4  Pure hypercholesterolemia  Comprehensive metabolic panel    Lipid panel   5  Encounter for screening for malignant neoplasm of prostate  PSA, Total Screen       Subjective:        Patient ID: Aram Gonzalez is a 79 y o  male  Chief Complaint   Patient presents with   [facility] Wellness Visit     BP readings,       Patient for 6 month check regarding blood pressure and labs  Also here for Medicare wellness  Overall well hand has ordered been skiing at least 6  - 7 times this year        The following portions of the patient's history were reviewed and updated as appropriate: past medical history, past surgical history and problem list       Review of Systems   Constitutional: Negative for fatigue and unexpected weight change  Cardiovascular: Negative  Gastrointestinal: Negative  Neurological: Negative for dizziness           Objective:  /60 (BP Location: Left arm, Patient Position: Sitting, Cuff Size: Standard)   Ht 5' 7 5" (1 715 m)   Wt 74 1 kg (163 lb 6 4 oz) BMI 25 21 kg/m²        Physical Exam   Constitutional: He is oriented to person, place, and time  He appears well-nourished  No distress  HENT:   Head: Normocephalic and atraumatic  Neck: No thyromegaly present  Cardiovascular: Normal rate, regular rhythm and normal heart sounds  Pulmonary/Chest: Breath sounds normal    Musculoskeletal: He exhibits no edema  Lymphadenopathy:     He has no cervical adenopathy  Neurological: He is alert and oriented to person, place, and time  Psychiatric: He has a normal mood and affect   His behavior is normal

## 2019-04-15 DIAGNOSIS — I10 ESSENTIAL HYPERTENSION: ICD-10-CM

## 2019-04-15 RX ORDER — LOSARTAN POTASSIUM 50 MG/1
50 TABLET ORAL DAILY
Qty: 90 TABLET | Refills: 1 | Status: SHIPPED | OUTPATIENT
Start: 2019-04-15 | End: 2019-10-10 | Stop reason: SDUPTHER

## 2019-06-10 ENCOUNTER — APPOINTMENT (OUTPATIENT)
Dept: LAB | Facility: CLINIC | Age: 69
End: 2019-06-10
Payer: COMMERCIAL

## 2019-06-10 DIAGNOSIS — E78.00 PURE HYPERCHOLESTEROLEMIA: ICD-10-CM

## 2019-06-10 DIAGNOSIS — Z12.5 ENCOUNTER FOR SCREENING FOR MALIGNANT NEOPLASM OF PROSTATE: ICD-10-CM

## 2019-06-10 DIAGNOSIS — I12.9 BENIGN HYPERTENSIVE CKD, STAGE 1-4 OR UNSPECIFIED CHRONIC KIDNEY DISEASE: ICD-10-CM

## 2019-06-10 LAB
ALBUMIN SERPL BCP-MCNC: 3.7 G/DL (ref 3.5–5)
ALP SERPL-CCNC: 92 U/L (ref 46–116)
ALT SERPL W P-5'-P-CCNC: 23 U/L (ref 12–78)
ANION GAP SERPL CALCULATED.3IONS-SCNC: 8 MMOL/L (ref 4–13)
AST SERPL W P-5'-P-CCNC: 19 U/L (ref 5–45)
BILIRUB SERPL-MCNC: 0.66 MG/DL (ref 0.2–1)
BUN SERPL-MCNC: 18 MG/DL (ref 5–25)
CALCIUM SERPL-MCNC: 9.2 MG/DL (ref 8.3–10.1)
CHLORIDE SERPL-SCNC: 111 MMOL/L (ref 100–108)
CHOLEST SERPL-MCNC: 152 MG/DL (ref 50–200)
CO2 SERPL-SCNC: 24 MMOL/L (ref 21–32)
CREAT SERPL-MCNC: 1.1 MG/DL (ref 0.6–1.3)
GFR SERPL CREATININE-BSD FRML MDRD: 69 ML/MIN/1.73SQ M
GLUCOSE P FAST SERPL-MCNC: 78 MG/DL (ref 65–99)
HDLC SERPL-MCNC: 50 MG/DL (ref 40–60)
LDLC SERPL CALC-MCNC: 79 MG/DL (ref 0–100)
NONHDLC SERPL-MCNC: 102 MG/DL
POTASSIUM SERPL-SCNC: 4 MMOL/L (ref 3.5–5.3)
PROT SERPL-MCNC: 6.6 G/DL (ref 6.4–8.2)
PSA SERPL-MCNC: 2.7 NG/ML (ref 0–4)
SODIUM SERPL-SCNC: 143 MMOL/L (ref 136–145)
TRIGL SERPL-MCNC: 117 MG/DL
TSH SERPL DL<=0.05 MIU/L-ACNC: 1.01 UIU/ML (ref 0.36–3.74)

## 2019-06-10 PROCEDURE — 36415 COLL VENOUS BLD VENIPUNCTURE: CPT

## 2019-06-10 PROCEDURE — G0103 PSA SCREENING: HCPCS

## 2019-06-10 PROCEDURE — 80061 LIPID PANEL: CPT

## 2019-06-10 PROCEDURE — 84443 ASSAY THYROID STIM HORMONE: CPT

## 2019-06-10 PROCEDURE — 80053 COMPREHEN METABOLIC PANEL: CPT

## 2019-06-17 ENCOUNTER — OFFICE VISIT (OUTPATIENT)
Dept: FAMILY MEDICINE CLINIC | Facility: CLINIC | Age: 69
End: 2019-06-17
Payer: COMMERCIAL

## 2019-06-17 VITALS
OXYGEN SATURATION: 96 % | BODY MASS INDEX: 24.31 KG/M2 | TEMPERATURE: 98 F | HEIGHT: 68 IN | DIASTOLIC BLOOD PRESSURE: 68 MMHG | HEART RATE: 88 BPM | SYSTOLIC BLOOD PRESSURE: 120 MMHG | WEIGHT: 160.4 LBS

## 2019-06-17 DIAGNOSIS — I12.9 BENIGN HYPERTENSIVE CKD, STAGE 1-4 OR UNSPECIFIED CHRONIC KIDNEY DISEASE: Primary | ICD-10-CM

## 2019-06-17 DIAGNOSIS — E78.00 PURE HYPERCHOLESTEROLEMIA: ICD-10-CM

## 2019-06-17 DIAGNOSIS — N18.2 CHRONIC KIDNEY DISEASE, STAGE 2 (MILD): ICD-10-CM

## 2019-06-17 PROCEDURE — 3008F BODY MASS INDEX DOCD: CPT | Performed by: FAMILY MEDICINE

## 2019-06-17 PROCEDURE — 1160F RVW MEDS BY RX/DR IN RCRD: CPT | Performed by: FAMILY MEDICINE

## 2019-06-17 PROCEDURE — 99213 OFFICE O/P EST LOW 20 MIN: CPT | Performed by: FAMILY MEDICINE

## 2019-06-17 PROCEDURE — 1036F TOBACCO NON-USER: CPT | Performed by: FAMILY MEDICINE

## 2019-10-09 ENCOUNTER — IMMUNIZATIONS (OUTPATIENT)
Dept: FAMILY MEDICINE CLINIC | Facility: CLINIC | Age: 69
End: 2019-10-09
Payer: COMMERCIAL

## 2019-10-09 DIAGNOSIS — Z23 ENCOUNTER FOR IMMUNIZATION: ICD-10-CM

## 2019-10-09 PROCEDURE — G0008 ADMIN INFLUENZA VIRUS VAC: HCPCS | Performed by: FAMILY MEDICINE

## 2019-10-09 PROCEDURE — 90662 IIV NO PRSV INCREASED AG IM: CPT | Performed by: FAMILY MEDICINE

## 2019-10-10 DIAGNOSIS — I10 ESSENTIAL HYPERTENSION: ICD-10-CM

## 2019-10-10 NOTE — TELEPHONE ENCOUNTER
Vibha Rodrigez called the office requesting a refill of his Losartan 50 mg pt requests  90 day supply to be sent to [facility] [place]

## 2019-10-11 RX ORDER — LOSARTAN POTASSIUM 50 MG/1
50 TABLET ORAL DAILY
Qty: 90 TABLET | Refills: 1 | Status: SHIPPED | OUTPATIENT
Start: 2019-10-11 | End: 2020-04-06 | Stop reason: SDUPTHER

## 2019-12-10 ENCOUNTER — APPOINTMENT (OUTPATIENT)
Dept: LAB | Facility: CLINIC | Age: 69
End: 2019-12-10
Payer: COMMERCIAL

## 2019-12-10 ENCOUNTER — TRANSCRIBE ORDERS (OUTPATIENT)
Dept: LAB | Facility: CLINIC | Age: 69
End: 2019-12-10

## 2019-12-10 DIAGNOSIS — E78.00 PURE HYPERCHOLESTEROLEMIA: ICD-10-CM

## 2019-12-10 LAB
ALBUMIN SERPL BCP-MCNC: 3.5 G/DL (ref 3.5–5)
ALP SERPL-CCNC: 100 U/L (ref 46–116)
ALT SERPL W P-5'-P-CCNC: 32 U/L (ref 12–78)
ANION GAP SERPL CALCULATED.3IONS-SCNC: 3 MMOL/L (ref 4–13)
AST SERPL W P-5'-P-CCNC: 19 U/L (ref 5–45)
BILIRUB SERPL-MCNC: 0.63 MG/DL (ref 0.2–1)
BUN SERPL-MCNC: 15 MG/DL (ref 5–25)
CALCIUM SERPL-MCNC: 9.3 MG/DL (ref 8.3–10.1)
CHLORIDE SERPL-SCNC: 108 MMOL/L (ref 100–108)
CHOLEST SERPL-MCNC: 182 MG/DL (ref 50–200)
CO2 SERPL-SCNC: 28 MMOL/L (ref 21–32)
CREAT SERPL-MCNC: 1.12 MG/DL (ref 0.6–1.3)
GFR SERPL CREATININE-BSD FRML MDRD: 68 ML/MIN/1.73SQ M
GLUCOSE P FAST SERPL-MCNC: 97 MG/DL (ref 65–99)
HDLC SERPL-MCNC: 57 MG/DL
LDLC SERPL CALC-MCNC: 97 MG/DL (ref 0–100)
NONHDLC SERPL-MCNC: 125 MG/DL
POTASSIUM SERPL-SCNC: 4 MMOL/L (ref 3.5–5.3)
PROT SERPL-MCNC: 6.9 G/DL (ref 6.4–8.2)
SODIUM SERPL-SCNC: 139 MMOL/L (ref 136–145)
TRIGL SERPL-MCNC: 139 MG/DL

## 2019-12-10 PROCEDURE — 80061 LIPID PANEL: CPT

## 2019-12-10 PROCEDURE — 80053 COMPREHEN METABOLIC PANEL: CPT

## 2019-12-10 PROCEDURE — 36415 COLL VENOUS BLD VENIPUNCTURE: CPT

## 2019-12-17 ENCOUNTER — OFFICE VISIT (OUTPATIENT)
Dept: FAMILY MEDICINE CLINIC | Facility: CLINIC | Age: 69
End: 2019-12-17
Payer: COMMERCIAL

## 2019-12-17 VITALS
DIASTOLIC BLOOD PRESSURE: 68 MMHG | TEMPERATURE: 97.9 F | HEART RATE: 108 BPM | HEIGHT: 68 IN | BODY MASS INDEX: 25.16 KG/M2 | OXYGEN SATURATION: 96 % | WEIGHT: 166 LBS | RESPIRATION RATE: 17 BRPM | SYSTOLIC BLOOD PRESSURE: 122 MMHG

## 2019-12-17 DIAGNOSIS — I12.9 BENIGN HYPERTENSIVE CKD, STAGE 1-4 OR UNSPECIFIED CHRONIC KIDNEY DISEASE: ICD-10-CM

## 2019-12-17 DIAGNOSIS — Z12.5 PROSTATE CANCER SCREENING: ICD-10-CM

## 2019-12-17 DIAGNOSIS — Z12.11 SCREENING FOR MALIGNANT NEOPLASM OF COLON: Primary | ICD-10-CM

## 2019-12-17 DIAGNOSIS — E78.00 PURE HYPERCHOLESTEROLEMIA: ICD-10-CM

## 2019-12-17 PROCEDURE — 99214 OFFICE O/P EST MOD 30 MIN: CPT | Performed by: FAMILY MEDICINE

## 2019-12-23 NOTE — PROGRESS NOTES
Assessment/Plan:         Diagnoses and all orders for this visit:    Screening for malignant neoplasm of colon  -     Ambulatory referral for colonoscopy; Future    Benign hypertensive CKD, 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 chronic kidney disease  -     Basic metabolic panel; Future    Pure hypercholesterolemia  -     Lipid panel; Future    Prostate cancer screening  -     PSA, Total Screen; Future        1  Screening for malignant neoplasm of colon  Ambulatory referral for colonoscopy   2  Benign hypertensive CKD, 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 chronic kidney disease  Basic metabolic panel   3  Pure hypercholesterolemia  Lipid panel   4  Prostate cancer screening  PSA, Total Screen       Subjective:        Patient ID: Ronald Woodruff is a 81 y o  male  Chief Complaint   Patient presents with    Follow-up     Pt presents for a 6 month f/u         fortino      The following portions of the patient's history were reviewed and updated as appropriate: past medical history, past surgical history and problem list       Review of Systems   Constitutional: Negative for appetite change, fatigue, fever and unexpected weight change  HENT: Negative for congestion, ear pain, postnasal drip, rhinorrhea, sinus pressure, sinus pain and sore throat  Eyes: Negative for redness and visual disturbance  Respiratory: Negative for chest tightness and shortness of breath  Cardiovascular: Negative for chest pain, palpitations and leg swelling  Gastrointestinal: Negative for abdominal distention, abdominal pain, diarrhea and nausea  Endocrine: Negative for cold intolerance and heat intolerance  Genitourinary: Negative for dysuria and hematuria  Musculoskeletal: Negative for arthralgias, gait problem and myalgias  Skin: Negative for pallor and rash  Neurological: Negative for dizziness and headaches  Psychiatric/Behavioral: Negative for behavioral problems  The patient is not nervous/anxious            Objective:  /68 (BP Location: Left arm, Patient Position: Sitting, Cuff Size: Adult)   Pulse (!) 108   Temp 97 9 °F (36 6 °C) (Temporal)   Resp 17   Ht 5' 7 72" (1 72 m)   Wt 75 3 kg (166 lb)   SpO2 96%   BMI 25 45 kg/m²        Physical Exam   Constitutional: He is oriented to person, place, and time  He appears well-nourished  No distress  HENT:   Head: Normocephalic and atraumatic  Right Ear: Tympanic membrane normal    Left Ear: Tympanic membrane normal    Mouth/Throat: Oropharynx is clear and moist    Eyes: Pupils are equal, round, and reactive to light  Conjunctivae and EOM are normal  No scleral icterus  Neck: Normal range of motion  Neck supple  No thyromegaly present  Cardiovascular: Normal rate, regular rhythm, normal heart sounds and intact distal pulses  No murmur heard  Pulses:       Carotid pulses are 0 on the right side, and 0 on the left side  Pulmonary/Chest: Effort normal and breath sounds normal  No respiratory distress  He has no wheezes  Abdominal: Soft  He exhibits no distension  Musculoskeletal: He exhibits no edema  Lymphadenopathy:     He has no cervical adenopathy  Neurological: He is alert and oriented to person, place, and time  He has normal reflexes  No cranial nerve deficit  Skin: Skin is warm  No pallor  Psychiatric: He has a normal mood and affect  His behavior is normal  Judgment and thought content normal    Nursing note and vitals reviewed

## 2020-01-03 DIAGNOSIS — E78.00 PURE HYPERCHOLESTEROLEMIA: ICD-10-CM

## 2020-01-03 RX ORDER — ROSUVASTATIN CALCIUM 5 MG/1
5 TABLET, COATED ORAL DAILY
Qty: 90 TABLET | Refills: 2 | Status: SHIPPED | OUTPATIENT
Start: 2020-01-03 | End: 2021-07-07 | Stop reason: SDUPTHER

## 2020-01-09 ENCOUNTER — TELEPHONE (OUTPATIENT)
Dept: GASTROENTEROLOGY | Facility: MEDICAL CENTER | Age: 70
End: 2020-01-09

## 2020-01-09 ENCOUNTER — PREP FOR PROCEDURE (OUTPATIENT)
Dept: GASTROENTEROLOGY | Facility: MEDICAL CENTER | Age: 70
End: 2020-01-09

## 2020-01-09 DIAGNOSIS — Z86.010 HISTORY OF COLON POLYPS: Primary | ICD-10-CM

## 2020-01-09 NOTE — TELEPHONE ENCOUNTER
Pt is scheduled at [facility] with dr Cl Guadarrama for a recall colon on 1/29/20, I went over mirala with pt and he has blue folder with prep instructions  Patient is aware he will need a  to and from   he will get a call the day before with an exact time for arrival

## 2020-01-10 ENCOUNTER — ANESTHESIA EVENT (OUTPATIENT)
Dept: GASTROENTEROLOGY | Facility: MEDICAL CENTER | Age: 70
End: 2020-01-10

## 2020-01-29 ENCOUNTER — ANESTHESIA (OUTPATIENT)
Dept: GASTROENTEROLOGY | Facility: MEDICAL CENTER | Age: 70
End: 2020-01-29

## 2020-01-29 ENCOUNTER — HOSPITAL ENCOUNTER (OUTPATIENT)
Dept: GASTROENTEROLOGY | Facility: MEDICAL CENTER | Age: 70
Setting detail: OUTPATIENT SURGERY
Discharge: HOME | End: 2020-01-29
Attending: INTERNAL MEDICINE | Admitting: INTERNAL MEDICINE
Payer: COMMERCIAL

## 2020-01-29 VITALS
SYSTOLIC BLOOD PRESSURE: 90 MMHG | OXYGEN SATURATION: 96 % | DIASTOLIC BLOOD PRESSURE: 59 MMHG | TEMPERATURE: 97.4 F | RESPIRATION RATE: 19 BRPM | HEART RATE: 79 BPM

## 2020-01-29 DIAGNOSIS — Z86.010 HISTORY OF COLON POLYPS: ICD-10-CM

## 2020-01-29 PROCEDURE — 88305 TISSUE EXAM BY PATHOLOGIST: CPT | Performed by: PATHOLOGY

## 2020-01-29 PROCEDURE — 45385 COLONOSCOPY W/LESION REMOVAL: CPT | Performed by: INTERNAL MEDICINE

## 2020-01-29 RX ORDER — SODIUM CHLORIDE 9 MG/ML
125 INJECTION, SOLUTION INTRAVENOUS CONTINUOUS
Status: DISCONTINUED | OUTPATIENT
Start: 2020-01-29 | End: 2020-02-02 | Stop reason: HOSPADM

## 2020-01-29 RX ORDER — PROPOFOL 10 MG/ML
INJECTION, EMULSION INTRAVENOUS AS NEEDED
Status: DISCONTINUED | OUTPATIENT
Start: 2020-01-29 | End: 2020-01-29 | Stop reason: SURG

## 2020-01-29 RX ADMIN — PROPOFOL 30 MG: 10 INJECTION, EMULSION INTRAVENOUS at 11:06

## 2020-01-29 RX ADMIN — PROPOFOL 30 MG: 10 INJECTION, EMULSION INTRAVENOUS at 11:08

## 2020-01-29 RX ADMIN — LIDOCAINE HYDROCHLORIDE 40 MG: 20 INJECTION, SOLUTION INTRAVENOUS at 11:01

## 2020-01-29 RX ADMIN — SODIUM CHLORIDE 125 ML/HR: 0.9 INJECTION, SOLUTION INTRAVENOUS at 10:29

## 2020-01-29 RX ADMIN — PROPOFOL 50 MG: 10 INJECTION, EMULSION INTRAVENOUS at 11:01

## 2020-01-29 RX ADMIN — PROPOFOL 30 MG: 10 INJECTION, EMULSION INTRAVENOUS at 11:04

## 2020-01-29 RX ADMIN — PROPOFOL 30 MG: 10 INJECTION, EMULSION INTRAVENOUS at 11:11

## 2020-01-29 NOTE — ANESTHESIA PREPROCEDURE EVALUATION
Review of Systems/Medical History  Patient summary reviewed  Chart reviewed  No history of anesthetic complications     Cardiovascular  EKG reviewed, Hyperlipidemia, Hypertension ,    Pulmonary  Negative pulmonary ROS        GI/Hepatic    Bowel prep       Chronic kidney disease stage 2,        Endo/Other  Negative endo/other ROS      GYN       Hematology  Negative hematology ROS      Musculoskeletal  Negative musculoskeletal ROS        Neurology  Negative neurology ROS      Psychology   Negative psychology ROS              Physical Exam    Airway    Mallampati score: II  TM Distance: >3 FB  Neck ROM: full     Dental   No notable dental hx     Cardiovascular  Rhythm: regular, Rate: normal, Cardiovascular exam normal    Pulmonary  Pulmonary exam normal Breath sounds clear to auscultation,     Other Findings        Anesthesia Plan  ASA Score- 2     Anesthesia Type- IV sedation with anesthesia with ASA Monitors  Additional Monitors:   Airway Plan:         Plan Factors-    Induction- intravenous  Postoperative Plan-     Informed Consent- Anesthetic plan and risks discussed with patient

## 2020-01-29 NOTE — H&P
History and Physical - [facility]  Arnel Atkinson 81 y o  male MRN: [id number]                  HPI: Arnel Atkinson is a 81y o  year old male who presents for colonoscopy evaluation due to history of colonic polyps  Last colonoscopy was 2016  REVIEW OF SYSTEMS: Per the HPI, and otherwise unremarkable  Historical Information   Past Medical History:   Diagnosis Date    Acute pain of right shoulder     [id number] RESOLVED    Anemia     [id number] RESOLVED    Colon polyp     Hyperlipemia     Hypertension      Past Surgical History:   Procedure Laterality Date    BASAL CELL CARCINOMA EXCISION  11/03/2018    on back    CATARACT EXTRACTION Right     COLONOSCOPY      EYE SURGERY      right     [place] COLONOSCOPY FLX DX W/COLLJ SPEC WHEN PFRMD N/A 8/17/2016    Procedure: COLONOSCOPY;  Surgeon: Ciera Rich MD;  Location: [facility];   Service: Gastroenterology     Social History   Social History     Substance and Sexual Activity   Alcohol Use Yes    Comment: social     Social History     Substance and Sexual Activity   Drug Use Not on file     Social History     Tobacco Use   Smoking Status Never Smoker   Smokeless Tobacco Never Used     Family History   Problem Relation Age of Onset    Other Mother         ATHEROSCLEROSIS    Kidney disease Mother         CHRONIC     Hypertension Mother         ESSENTIAL    Glaucoma Mother     Macular degeneration Mother     Glaucoma Father     Multiple sclerosis Sister        Meds/Allergies       (Not in a hospital admission)    Allergies   Allergen Reactions    Ezetimibe-Simvastatin      Annotation - [id number]: myalgia    Other      Pentothal KIT       Objective     /92   Pulse (!) 113   Temp (!) 97 4 °F (36 3 °C) (Temporal)   Resp 20   SpO2 98%       PHYSICAL EXAM    Gen: NAD  CV: RRR  CHEST: Clear  ABD: soft, NT/ND  EXT: no edema      ASSESSMENT/PLAN:  This is a 81y o  year old male here for colonoscopy, and he is stable and optimized for his procedure

## 2020-01-29 NOTE — DISCHARGE INSTRUCTIONS
Colonoscopy   WHAT YOU NEED TO KNOW:   A colonoscopy is a procedure to examine the inside of your colon (intestine) with a scope  Polyps or tissue growths may have been removed during your colonoscopy  It is normal to feel bloated and to have some abdominal discomfort  You should be passing gas  If you have hemorrhoids or you had polyps removed, you may have a small amount of bleeding  DISCHARGE INSTRUCTIONS:   Seek care immediately if:   · You have a large amount of bright red blood in your bowel movements  · Your abdomen is hard and firm and you have severe pain  · You have sudden trouble breathing  Contact your healthcare provider if:   · You develop a rash or hives  · You have a fever within 24 hours of your procedure  · You have not had a bowel movement for 3 days after your procedure  · You have questions or concerns about your condition or care  Activity:   · Do not lift, strain, or run  for 3 days after your procedure  · Rest after your procedure  You have been given medicine to relax you  Do not  drive or make important decisions until the day after your procedure  Return to your normal activity as directed  · Relieve gas and discomfort from bloating  by lying on your right side with a heating pad on your abdomen  You may need to take short walks to help the gas move out  Eat small meals until bloating is relieved  If you had polyps removed: For 7 days after your procedure:  · Do not  take aspirin  · Do not  go on long car rides  Help prevent constipation:   · Eat a variety of healthy foods  Healthy foods include fruit, vegetables, whole-grain breads, low-fat dairy products, beans, lean meat, and fish  Ask if you need to be on a special diet  Your healthcare provider may recommend that you eat high-fiber foods such as cooked beans  Fiber helps you have regular bowel movements  · Drink liquids as directed    Adults should drink between 9 and 13 eight-ounce cups of liquid every day  Ask what amount is best for you  For most people, good liquids to drink are water, juice, and milk  · Exercise as directed  Talk to your healthcare provider about the best exercise plan for you  Exercise can help prevent constipation, decrease your blood pressure and improve your health  Follow up with your healthcare provider as directed:  Write down your questions so you remember to ask them during your visits  © 2017 [employer] Information is for End User's use only and may not be sold, redistributed or otherwise used for commercial purposes  All illustrations and images included in CareNotes® are the copyrighted property of A D A M , Inc  or Neftali Shepherd  The above information is an  only  It is not intended as medical advice for individual conditions or treatments  Talk to your doctor, nurse or pharmacist before following any medical regimen to see if it is safe and effective for you  Colorectal Polyps   WHAT YOU NEED TO KNOW:   What are colorectal polyps? Colorectal polyps are small growths of tissue in the lining of the colon and rectum  Most polyps are hyperplastic polyps and are usually benign (noncancerous)  Certain types of polyps, called adenomatous polyps, may turn into cancer  What increases my risk of colorectal polyps? The exact cause of colorectal polyps is unknown  The following may increase your risk:  · Older age    · A diet of foods high in fat and low in fiber     · Family history of polyps    · Intestinal diseases, such as Crohn's disease or ulcerative colitis    · An unhealthy lifestyle, such as physical inactivity, smoking, or drinking alcohol    · Obesity  What are the signs and symptoms of colorectal polyps? · Blood in your bowel movement or bleeding from the rectum    · Change in bowel movement habits, such as diarrhea and constipation    · Abdominal pain  How are colorectal polyps diagnosed?   You should have fecal blood screening once a year for colorectal disease if you are over 48years old  You should be screened earlier if you have an intestinal disease or a family history of polyps or colorectal cancer  During this screening, a sample of your bowel movement is checked for blood, which may be an early sign of colorectal polyps or cancer  You may also need any of the following tests:  · Digital rectal exam:  Your healthcare provider will examine your anus and use a finger to check your rectum for polyps  · Barium enema: A barium enema is an x-ray of the colon  A tube is put into your anus, and a liquid called barium is put through the tube  Barium is used so that caregivers can see your colon better on the x-ray film  · Virtual colonoscopy: This is a CT scan that takes pictures of the inside of your colon and rectum  A small, flexible tube is put into your rectum and air or carbon dioxide (gas) is used to expand your colon  This lets healthcare providers clearly see your colon and any polyps on a monitor  · Colonoscopy or sigmoidoscopy: These procedures help your healthcare provider see the inside of your colon using a flexible tube with a small light and camera on the end  During a sigmoidoscopy, your healthcare provider will only look at rectum and lower colon  During a colonoscopy, healthcare providers will look at the full length of your colon  Healthcare providers may remove a small amount of tissue from the colon for a biopsy  How are colorectal polyps treated? · Polyp removal:  Polyps may be removed during your sigmoidoscopy or colonoscopy  · Polypectomy: This is surgery to remove your polyps  You may need laparoscopic or open surgery, depending on the type, size, and number of polyps that you have  Laparoscopy is done by inserting a small, flexible scope into incisions made on your abdomen  Open surgery is done by making a larger incision on your abdomen     What are the risks of colorectal polyps? You may bleed during a colonoscopy procedure  Your bowel may be perforated (torn) when polyps are removed  This may lead to an open abdominal surgery  During surgery, you may bleed too much or get an infection  Adenomatous polyps that are not removed may turn into cancer and become more difficult to treat  Where can I find support and more information? · [address] ([facility])  [address] , [place] [address]  Phone: [phone]  Web Address: Eunice Vega  [facility] gov  When should I contact my healthcare provider? · You have a fever  · You have chills, a cough, or feel weak and achy  · You have abdominal pain that does not go away or gets worse after you take medicine  · Your abdomen is swollen  · You are losing weight without trying  · You have questions or concerns about your condition or care  When should I seek immediate care or call 911? · You have sudden shortness of breath  · You have a fast heart rate, fast breathing, or are too dizzy to stand up  · You have severe abdominal pain  · You see blood in your bowel movement  CARE AGREEMENT:   You have the right to help plan your care  Learn about your health condition and how it may be treated  Discuss treatment options with your caregivers to decide what care you want to receive  You always have the right to refuse treatment  The above information is an  only  It is not intended as medical advice for individual conditions or treatments  Talk to your doctor, nurse or pharmacist before following any medical regimen to see if it is safe and effective for you  © 2017 [facility] Information is for End User's use only and may not be sold, redistributed or otherwise used for commercial purposes   All illustrations and images included in CareNotes® are the copyrighted property of A D A [employer]  or [employer]  Diverticulitis   WHAT YOU NEED TO KNOW:   What is diverticulitis? Diverticulitis is a condition that causes small pockets along your intestine called diverticula to become inflamed or infected  This is caused by hard bowel movement, food, or bacteria that get stuck in the pockets  What are the signs and symptoms of diverticulitis? · Pain in the lower left side of your abdomen    · Fever and chills    · Nausea or vomiting     · Constipation or diarrhea     · An urge to urinate or have a bowel movement more often than usual     · Bloody bowel movements    · Bloating and gas  How is diverticulitis diagnosed? Your healthcare provider will examine you  He or she will ask questions about your symptoms and health history  You may need any of the following:  · Blood and urine tests  may show signs of infection or inflammation  · CT scan or ultrasound  pictures may be used to find problems in your intestines  You may be given contrast liquid to help your intestines show up better in the pictures  Tell the healthcare provider if you have ever had an allergic reaction to contrast liquid  · A colonoscopy  is used to look at your intestines with a scope  A scope (long bendable tube with a light on the end) is used to take pictures  This test may show swollen diverticula or bleeding  Samples may be taken from your digestive tract and sent to a lab for tests  Bleeding may be controlled with tools that are inserted through the scope  How is diverticulitis treated? Mild diverticulitis can be treated at home  You may need to rest and follow a clear liquid diet until your diverticulitis gets better  You will be admitted to the hospital if you have severe diverticulitis  You may need any of the following:  · Antibiotics  help treat or prevent a bacterial infection  · Prescription pain medicine  may be given  Ask your healthcare provider how to take this medicine safely   Some prescription pain medicines contain acetaminophen  Do not take other medicines that contain acetaminophen without talking to your healthcare provider  Too much acetaminophen may cause liver damage  Prescription pain medicine may cause constipation  Ask your healthcare provider how to prevent or treat constipation  · An IV  may be used to give you liquids and nutrition  You may not be able to eat or drink anything until your healthcare provider says it is okay  · Drainage  may be done to reduce inflammation or treat infection  Your healthcare provider may insert a small tube through an incision in your abdomen to drain pus from infected diverticula  · Surgery  may be needed if there is a hole in your bowel or a large amount of swelling  A healthcare provider will remove the infected or inflamed areas of your colon  How can I manage my symptoms? A clear liquid diet will allow your intestines to heal  A clear liquid diet includes any liquids that you can see through  Examples include water, ginger-geo, cranberry or apple juice, frozen fruit ice, or broth  Ask your healthcare provider for more information on a clear liquid diet  When should I seek immediate care? · You have bowel movement or foul-smelling discharge leaking from your vagina or in your urine  · You have severe diarrhea  · You urinate less than usual or not at all  · You are not able to have a bowel movement  · You cannot stop vomiting  · You have severe abdominal pain, a fever, and your abdomen is larger than usual      · You have new or increased blood in your bowel movements  When should I contact my healthcare provider? · You have pain when you urinate  · Your symptoms get worse or do not go away  · You have questions or concerns about your condition or care  CARE AGREEMENT:   You have the right to help plan your care  Learn about your health condition and how it may be treated   Discuss treatment options with your caregivers to decide what care you want to receive  You always have the right to refuse treatment  The above information is an  only  It is not intended as medical advice for individual conditions or treatments  Talk to your doctor, nurse or pharmacist before following any medical regimen to see if it is safe and effective for you  © 2017 [address] [employer] Information is for End User's use only and may not be sold, redistributed or otherwise used for commercial purposes  All illustrations and images included in CareNotes® are the copyrighted property of A [employer] A M , Inc  or Neftali Shepherd

## 2020-04-06 DIAGNOSIS — I10 ESSENTIAL HYPERTENSION: ICD-10-CM

## 2020-04-06 RX ORDER — LOSARTAN POTASSIUM 50 MG/1
50 TABLET ORAL DAILY
Qty: 90 TABLET | Refills: 1 | Status: SHIPPED | OUTPATIENT
Start: 2020-04-06 | End: 2020-10-05 | Stop reason: SDUPTHER

## 2020-06-12 ENCOUNTER — APPOINTMENT (OUTPATIENT)
Dept: LAB | Facility: CLINIC | Age: 70
End: 2020-06-12
Payer: COMMERCIAL

## 2020-06-12 DIAGNOSIS — I12.9 BENIGN HYPERTENSIVE CKD, STAGE 1-4 OR UNSPECIFIED CHRONIC KIDNEY DISEASE: ICD-10-CM

## 2020-06-12 DIAGNOSIS — Z12.5 PROSTATE CANCER SCREENING: ICD-10-CM

## 2020-06-12 DIAGNOSIS — E78.00 PURE HYPERCHOLESTEROLEMIA: ICD-10-CM

## 2020-06-12 LAB
ANION GAP SERPL CALCULATED.3IONS-SCNC: 6 MMOL/L (ref 4–13)
BUN SERPL-MCNC: 19 MG/DL (ref 5–25)
CALCIUM SERPL-MCNC: 9 MG/DL (ref 8.3–10.1)
CHLORIDE SERPL-SCNC: 109 MMOL/L (ref 100–108)
CHOLEST SERPL-MCNC: 166 MG/DL (ref 50–200)
CO2 SERPL-SCNC: 25 MMOL/L (ref 21–32)
CREAT SERPL-MCNC: 1.27 MG/DL (ref 0.6–1.3)
GFR SERPL CREATININE-BSD FRML MDRD: 58 ML/MIN/1.73SQ M
GLUCOSE P FAST SERPL-MCNC: 97 MG/DL (ref 65–99)
HDLC SERPL-MCNC: 48 MG/DL
LDLC SERPL CALC-MCNC: 95 MG/DL (ref 0–100)
NONHDLC SERPL-MCNC: 118 MG/DL
POTASSIUM SERPL-SCNC: 4.5 MMOL/L (ref 3.5–5.3)
PSA SERPL-MCNC: 2.4 NG/ML (ref 0–4)
SODIUM SERPL-SCNC: 140 MMOL/L (ref 136–145)
TRIGL SERPL-MCNC: 114 MG/DL

## 2020-06-12 PROCEDURE — G0103 PSA SCREENING: HCPCS

## 2020-06-12 PROCEDURE — 80061 LIPID PANEL: CPT

## 2020-06-12 PROCEDURE — 80048 BASIC METABOLIC PNL TOTAL CA: CPT

## 2020-06-12 PROCEDURE — 36415 COLL VENOUS BLD VENIPUNCTURE: CPT

## 2020-06-18 ENCOUNTER — OFFICE VISIT (OUTPATIENT)
Dept: FAMILY MEDICINE CLINIC | Facility: CLINIC | Age: 70
End: 2020-06-18
Payer: COMMERCIAL

## 2020-06-18 VITALS
BODY MASS INDEX: 25.03 KG/M2 | DIASTOLIC BLOOD PRESSURE: 64 MMHG | HEIGHT: 67 IN | HEART RATE: 114 BPM | OXYGEN SATURATION: 97 % | SYSTOLIC BLOOD PRESSURE: 120 MMHG | TEMPERATURE: 97.5 F | WEIGHT: 159.5 LBS

## 2020-06-18 DIAGNOSIS — I12.9 BENIGN HYPERTENSIVE CKD, STAGE 1-4 OR UNSPECIFIED CHRONIC KIDNEY DISEASE: Primary | ICD-10-CM

## 2020-06-18 DIAGNOSIS — K63.5 POLYP OF COLON, UNSPECIFIED PART OF COLON, UNSPECIFIED TYPE: ICD-10-CM

## 2020-06-18 DIAGNOSIS — N18.2 CHRONIC KIDNEY DISEASE, STAGE 2 (MILD): ICD-10-CM

## 2020-06-18 DIAGNOSIS — E78.00 PURE HYPERCHOLESTEROLEMIA: ICD-10-CM

## 2020-06-18 DIAGNOSIS — R00.0 TACHYCARDIA: ICD-10-CM

## 2020-06-18 PROCEDURE — 3008F BODY MASS INDEX DOCD: CPT | Performed by: FAMILY MEDICINE

## 2020-06-18 PROCEDURE — 99214 OFFICE O/P EST MOD 30 MIN: CPT | Performed by: FAMILY MEDICINE

## 2020-06-18 PROCEDURE — 3078F DIAST BP <80 MM HG: CPT | Performed by: FAMILY MEDICINE

## 2020-06-18 PROCEDURE — 1036F TOBACCO NON-USER: CPT | Performed by: FAMILY MEDICINE

## 2020-06-18 PROCEDURE — 4040F PNEUMOC VAC/ADMIN/RCVD: CPT | Performed by: FAMILY MEDICINE

## 2020-06-18 PROCEDURE — 3074F SYST BP LT 130 MM HG: CPT | Performed by: FAMILY MEDICINE

## 2020-06-18 PROCEDURE — 1160F RVW MEDS BY RX/DR IN RCRD: CPT | Performed by: FAMILY MEDICINE

## 2020-10-05 DIAGNOSIS — I10 ESSENTIAL HYPERTENSION: ICD-10-CM

## 2020-10-05 RX ORDER — LOSARTAN POTASSIUM 50 MG/1
50 TABLET ORAL DAILY
Qty: 90 TABLET | Refills: 1 | Status: SHIPPED | OUTPATIENT
Start: 2020-10-05 | End: 2021-03-03 | Stop reason: SDUPTHER

## 2020-10-30 ENCOUNTER — IMMUNIZATIONS (OUTPATIENT)
Dept: FAMILY MEDICINE CLINIC | Facility: CLINIC | Age: 70
End: 2020-10-30
Payer: COMMERCIAL

## 2020-10-30 DIAGNOSIS — Z23 ENCOUNTER FOR IMMUNIZATION: Primary | ICD-10-CM

## 2020-10-30 PROCEDURE — G0008 ADMIN INFLUENZA VIRUS VAC: HCPCS

## 2020-10-30 PROCEDURE — 90662 IIV NO PRSV INCREASED AG IM: CPT

## 2020-12-24 ENCOUNTER — APPOINTMENT (OUTPATIENT)
Dept: LAB | Facility: CLINIC | Age: 70
End: 2020-12-24
Payer: COMMERCIAL

## 2020-12-24 DIAGNOSIS — E78.00 PURE HYPERCHOLESTEROLEMIA: ICD-10-CM

## 2020-12-24 LAB
ALBUMIN SERPL BCP-MCNC: 3.5 G/DL (ref 3.5–5)
ALP SERPL-CCNC: 105 U/L (ref 46–116)
ALT SERPL W P-5'-P-CCNC: 38 U/L (ref 12–78)
ANION GAP SERPL CALCULATED.3IONS-SCNC: 6 MMOL/L (ref 4–13)
AST SERPL W P-5'-P-CCNC: 27 U/L (ref 5–45)
BILIRUB SERPL-MCNC: 0.62 MG/DL (ref 0.2–1)
BUN SERPL-MCNC: 15 MG/DL (ref 5–25)
CALCIUM SERPL-MCNC: 9.6 MG/DL (ref 8.3–10.1)
CHLORIDE SERPL-SCNC: 108 MMOL/L (ref 100–108)
CHOLEST SERPL-MCNC: 172 MG/DL (ref 50–200)
CO2 SERPL-SCNC: 27 MMOL/L (ref 21–32)
CREAT SERPL-MCNC: 1.08 MG/DL (ref 0.6–1.3)
GFR SERPL CREATININE-BSD FRML MDRD: 70 ML/MIN/1.73SQ M
GLUCOSE P FAST SERPL-MCNC: 78 MG/DL (ref 65–99)
HDLC SERPL-MCNC: 57 MG/DL
LDLC SERPL CALC-MCNC: 96 MG/DL (ref 0–100)
NONHDLC SERPL-MCNC: 115 MG/DL
POTASSIUM SERPL-SCNC: 3.8 MMOL/L (ref 3.5–5.3)
PROT SERPL-MCNC: 6.7 G/DL (ref 6.4–8.2)
SODIUM SERPL-SCNC: 141 MMOL/L (ref 136–145)
TRIGL SERPL-MCNC: 93 MG/DL

## 2020-12-24 PROCEDURE — 36415 COLL VENOUS BLD VENIPUNCTURE: CPT

## 2020-12-24 PROCEDURE — 80061 LIPID PANEL: CPT

## 2020-12-24 PROCEDURE — 80053 COMPREHEN METABOLIC PANEL: CPT

## 2021-01-04 ENCOUNTER — OFFICE VISIT (OUTPATIENT)
Dept: FAMILY MEDICINE CLINIC | Facility: CLINIC | Age: 71
End: 2021-01-04
Payer: COMMERCIAL

## 2021-01-04 VITALS
RESPIRATION RATE: 16 BRPM | OXYGEN SATURATION: 95 % | BODY MASS INDEX: 25.46 KG/M2 | HEART RATE: 103 BPM | HEIGHT: 67 IN | WEIGHT: 162.2 LBS | DIASTOLIC BLOOD PRESSURE: 68 MMHG | TEMPERATURE: 96.2 F | SYSTOLIC BLOOD PRESSURE: 122 MMHG

## 2021-01-04 DIAGNOSIS — Z12.5 PROSTATE CANCER SCREENING: ICD-10-CM

## 2021-01-04 DIAGNOSIS — I10 ESSENTIAL HYPERTENSION: Primary | ICD-10-CM

## 2021-01-04 DIAGNOSIS — E78.00 PURE HYPERCHOLESTEROLEMIA: ICD-10-CM

## 2021-01-04 PROCEDURE — 3725F SCREEN DEPRESSION PERFORMED: CPT | Performed by: FAMILY MEDICINE

## 2021-01-04 PROCEDURE — 99213 OFFICE O/P EST LOW 20 MIN: CPT | Performed by: FAMILY MEDICINE

## 2021-01-04 PROCEDURE — 3074F SYST BP LT 130 MM HG: CPT | Performed by: FAMILY MEDICINE

## 2021-01-04 PROCEDURE — 1101F PT FALLS ASSESS-DOCD LE1/YR: CPT | Performed by: FAMILY MEDICINE

## 2021-01-04 PROCEDURE — 3008F BODY MASS INDEX DOCD: CPT | Performed by: FAMILY MEDICINE

## 2021-01-04 PROCEDURE — 3288F FALL RISK ASSESSMENT DOCD: CPT | Performed by: FAMILY MEDICINE

## 2021-01-04 PROCEDURE — 1160F RVW MEDS BY RX/DR IN RCRD: CPT | Performed by: FAMILY MEDICINE

## 2021-01-04 PROCEDURE — 1036F TOBACCO NON-USER: CPT | Performed by: FAMILY MEDICINE

## 2021-01-04 PROCEDURE — 3078F DIAST BP <80 MM HG: CPT | Performed by: FAMILY MEDICINE

## 2021-01-04 NOTE — PROGRESS NOTES
BMI Counseling: Body mass index is 25 4 kg/m²  The BMI is above normal  Nutrition recommendations include reducing portion sizes, decreasing overall calorie intake, 3-5 servings of fruits/vegetables daily, consuming healthier snacks, moderation in carbohydrate intake, increasing intake of lean protein, reducing intake of saturated fat and trans fat and reducing intake of cholesterol  Exercise recommendations include exercising 3-5 times per week

## 2021-01-06 ENCOUNTER — TELEPHONE (OUTPATIENT)
Dept: FAMILY MEDICINE CLINIC | Facility: CLINIC | Age: 71
End: 2021-01-06

## 2021-01-06 NOTE — TELEPHONE ENCOUNTER
lmom consent ok advising pt I did schedule him for his ext routine  appointment for 07/07/2021  If pt has questions please call

## 2021-01-10 NOTE — PROGRESS NOTES
Assessment/Plan:    Blood pressure excellent  LDL at goal on rosuvastatin  CMP stable  Recheck 6 months with repeat labs to include PSA  Flu shot up-to-date  COVID score and future     Diagnoses and all orders for this visit:    Essential hypertension  -     Comprehensive metabolic panel; Future  -     Lipid panel; Future    Pure hypercholesterolemia  -     Lipid panel; Future    Prostate cancer screening  -     PSA, Total Screen; Future        1  Essential hypertension  Comprehensive metabolic panel    Lipid panel   2  Pure hypercholesterolemia  Lipid panel   3  Prostate cancer screening  PSA, Total Screen       Subjective:        Patient ID: Leslie Wyatt is a 78 y o  male    Chief Complaint   Patient presents with    Follow-up     6 Month        HPI    The following portions of the patient's history were reviewed and updated as appropriate: past medical history, past surgical history and problem list       Review of Systems      Objective:  /68 (BP Location: Left arm, Patient Position: Sitting, Cuff Size: Adult)   Pulse 103   Temp (!) 96 2 °F (35 7 °C) (Temporal)   Resp 16   Ht 5' 7" (1 702 m)   Wt 73 6 kg (162 lb 3 2 oz)   SpO2 95%   BMI 25 40 kg/m²        Physical Exam

## 2021-03-03 DIAGNOSIS — I10 ESSENTIAL HYPERTENSION: ICD-10-CM

## 2021-03-04 RX ORDER — LOSARTAN POTASSIUM 50 MG/1
50 TABLET ORAL DAILY
Qty: 90 TABLET | Refills: 1 | Status: SHIPPED | OUTPATIENT
Start: 2021-03-04 | End: 2021-09-21 | Stop reason: SDUPTHER

## 2021-03-10 DIAGNOSIS — Z23 ENCOUNTER FOR IMMUNIZATION: ICD-10-CM

## 2021-03-19 ENCOUNTER — IMMUNIZATIONS (OUTPATIENT)
Dept: FAMILY MEDICINE CLINIC | Facility: HOSPITAL | Age: 71
End: 2021-03-19

## 2021-03-19 DIAGNOSIS — Z23 ENCOUNTER FOR IMMUNIZATION: Primary | ICD-10-CM

## 2021-03-19 PROCEDURE — 91300 SARS-COV-2 / COVID-19 MRNA VACCINE (PFIZER-BIONTECH) 30 MCG: CPT

## 2021-03-19 PROCEDURE — 0001A SARS-COV-2 / COVID-19 MRNA VACCINE (PFIZER-BIONTECH) 30 MCG: CPT

## 2021-04-13 ENCOUNTER — IMMUNIZATIONS (OUTPATIENT)
Dept: FAMILY MEDICINE CLINIC | Facility: HOSPITAL | Age: 71
End: 2021-04-13

## 2021-04-13 DIAGNOSIS — Z23 ENCOUNTER FOR IMMUNIZATION: Primary | ICD-10-CM

## 2021-04-13 PROCEDURE — 0002A SARS-COV-2 / COVID-19 MRNA VACCINE (PFIZER-BIONTECH) 30 MCG: CPT

## 2021-04-13 PROCEDURE — 91300 SARS-COV-2 / COVID-19 MRNA VACCINE (PFIZER-BIONTECH) 30 MCG: CPT

## 2021-06-29 ENCOUNTER — APPOINTMENT (OUTPATIENT)
Dept: LAB | Facility: CLINIC | Age: 71
End: 2021-06-29
Payer: COMMERCIAL

## 2021-06-29 ENCOUNTER — RA CDI HCC (OUTPATIENT)
Dept: OTHER | Facility: HOSPITAL | Age: 71
End: 2021-06-29

## 2021-06-29 DIAGNOSIS — Z12.5 PROSTATE CANCER SCREENING: ICD-10-CM

## 2021-06-29 DIAGNOSIS — E78.00 PURE HYPERCHOLESTEROLEMIA: ICD-10-CM

## 2021-06-29 DIAGNOSIS — I10 ESSENTIAL HYPERTENSION: ICD-10-CM

## 2021-06-29 LAB
ALBUMIN SERPL BCP-MCNC: 3.4 G/DL (ref 3.5–5)
ALP SERPL-CCNC: 100 U/L (ref 46–116)
ALT SERPL W P-5'-P-CCNC: 29 U/L (ref 12–78)
ANION GAP SERPL CALCULATED.3IONS-SCNC: 6 MMOL/L (ref 4–13)
AST SERPL W P-5'-P-CCNC: 23 U/L (ref 5–45)
BILIRUB SERPL-MCNC: 0.69 MG/DL (ref 0.2–1)
BUN SERPL-MCNC: 15 MG/DL (ref 5–25)
CALCIUM ALBUM COR SERPL-MCNC: 9.8 MG/DL (ref 8.3–10.1)
CALCIUM SERPL-MCNC: 9.3 MG/DL (ref 8.3–10.1)
CHLORIDE SERPL-SCNC: 111 MMOL/L (ref 100–108)
CHOLEST SERPL-MCNC: 178 MG/DL (ref 50–200)
CO2 SERPL-SCNC: 22 MMOL/L (ref 21–32)
CREAT SERPL-MCNC: 1.01 MG/DL (ref 0.6–1.3)
GFR SERPL CREATININE-BSD FRML MDRD: 76 ML/MIN/1.73SQ M
GLUCOSE P FAST SERPL-MCNC: 96 MG/DL (ref 65–99)
HDLC SERPL-MCNC: 61 MG/DL
LDLC SERPL CALC-MCNC: 95 MG/DL (ref 0–100)
NONHDLC SERPL-MCNC: 117 MG/DL
POTASSIUM SERPL-SCNC: 4.3 MMOL/L (ref 3.5–5.3)
PROT SERPL-MCNC: 6.9 G/DL (ref 6.4–8.2)
PSA SERPL-MCNC: 2.7 NG/ML (ref 0–4)
SODIUM SERPL-SCNC: 139 MMOL/L (ref 136–145)
TRIGL SERPL-MCNC: 111 MG/DL

## 2021-06-29 PROCEDURE — G0103 PSA SCREENING: HCPCS

## 2021-06-29 PROCEDURE — 80061 LIPID PANEL: CPT

## 2021-06-29 PROCEDURE — 80053 COMPREHEN METABOLIC PANEL: CPT

## 2021-06-29 PROCEDURE — 36415 COLL VENOUS BLD VENIPUNCTURE: CPT

## 2021-06-29 NOTE — PROGRESS NOTES
[facility]  coding opportunities          Chart reviewed, no opportunity found: CHART REVIEWED, NO OPPORTUNITY FOUND                     Patients insurance company:  [facility] (Medicare Advantage and Commercial)

## 2021-07-07 ENCOUNTER — OFFICE VISIT (OUTPATIENT)
Dept: FAMILY MEDICINE CLINIC | Facility: CLINIC | Age: 71
End: 2021-07-07
Payer: COMMERCIAL

## 2021-07-07 VITALS
HEIGHT: 67 IN | WEIGHT: 160.2 LBS | HEART RATE: 98 BPM | DIASTOLIC BLOOD PRESSURE: 72 MMHG | SYSTOLIC BLOOD PRESSURE: 112 MMHG | OXYGEN SATURATION: 99 % | RESPIRATION RATE: 16 BRPM | TEMPERATURE: 97.6 F | BODY MASS INDEX: 25.15 KG/M2

## 2021-07-07 DIAGNOSIS — I12.9 BENIGN HYPERTENSIVE CKD, STAGE 1-4 OR UNSPECIFIED CHRONIC KIDNEY DISEASE: ICD-10-CM

## 2021-07-07 DIAGNOSIS — N18.2 CHRONIC KIDNEY DISEASE, STAGE 2 (MILD): ICD-10-CM

## 2021-07-07 DIAGNOSIS — E78.00 PURE HYPERCHOLESTEROLEMIA: ICD-10-CM

## 2021-07-07 DIAGNOSIS — Z00.00 HEALTHCARE MAINTENANCE: Primary | ICD-10-CM

## 2021-07-07 PROCEDURE — 1125F AMNT PAIN NOTED PAIN PRSNT: CPT | Performed by: FAMILY MEDICINE

## 2021-07-07 PROCEDURE — 1036F TOBACCO NON-USER: CPT | Performed by: FAMILY MEDICINE

## 2021-07-07 PROCEDURE — 99213 OFFICE O/P EST LOW 20 MIN: CPT | Performed by: FAMILY MEDICINE

## 2021-07-07 PROCEDURE — 3725F SCREEN DEPRESSION PERFORMED: CPT | Performed by: FAMILY MEDICINE

## 2021-07-07 PROCEDURE — G0439 PPPS, SUBSEQ VISIT: HCPCS | Performed by: FAMILY MEDICINE

## 2021-07-07 PROCEDURE — 1170F FXNL STATUS ASSESSED: CPT | Performed by: FAMILY MEDICINE

## 2021-07-07 PROCEDURE — 3288F FALL RISK ASSESSMENT DOCD: CPT | Performed by: FAMILY MEDICINE

## 2021-07-07 PROCEDURE — 1160F RVW MEDS BY RX/DR IN RCRD: CPT | Performed by: FAMILY MEDICINE

## 2021-07-07 RX ORDER — ROSUVASTATIN CALCIUM 5 MG/1
5 TABLET, COATED ORAL DAILY
Qty: 90 TABLET | Refills: 2 | Status: SHIPPED | OUTPATIENT
Start: 2021-07-07 | End: 2022-06-08

## 2021-07-07 RX ORDER — ROSUVASTATIN CALCIUM 5 MG/1
5 TABLET, COATED ORAL DAILY
Qty: 90 TABLET | Refills: 2 | Status: SHIPPED | OUTPATIENT
Start: 2021-07-07 | End: 2021-07-07 | Stop reason: SDUPTHER

## 2021-07-07 NOTE — PROGRESS NOTES
Assessment and Plan:     Problem List Items Addressed This Visit        Genitourinary    Benign hypertensive CKD, 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 chronic kidney disease    Chronic kidney disease, stage 2 (mild)       Other    Hyperlipidemia    Relevant Medications    rosuvastatin (CRESTOR) 5 mg tablet    Other Relevant Orders    Lipid panel    Comprehensive metabolic panel      Other Visit Diagnoses     Healthcare maintenance    -  Primary           Preventive health issues were discussed with patient, and age appropriate screening tests were ordered as noted in patient's After Visit Summary  Personalized health advice and appropriate referrals for health education or preventive services given if needed, as noted in patient's After Visit Summary  History of Present Illness:     Patient presents for [place] to Medicare visit       Patient Care Team:  Pauline Meter DO as PCP - General  Lesa Shannon MD as Endoscopist     Review of Systems:     Review of Systems   Problem List:     Patient Active Problem List   Diagnosis    Benign hypertensive CKD, 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 chronic kidney disease    Chronic kidney disease, stage 2 (mild)    Hyperlipidemia    Vitamin D deficiency      Past Medical and Surgical History:     Past Medical History:   Diagnosis Date    Acute pain of right shoulder     [id number] RESOLVED    Anemia     [id number] RESOLVED    Colon polyp     Hyperlipemia     Hypertension      Past Surgical History:   Procedure Laterality Date    BASAL CELL CARCINOMA EXCISION  11/03/2018    on back    CATARACT EXTRACTION Right     COLONOSCOPY      EYE SURGERY      right     LA COLONOSCOPY FLX DX W/COLLJ SPEC WHEN PFRMD N/A 8/17/2016     COLONOSCOPY;  Surgeon: Lesa Shannon 2016 - 2019 Paulene Morning) - 2022      Family History:     Family History   Problem Relation Age of Onset    Other Mother         ATHEROSCLEROSIS    Kidney disease Mother         CHRONIC     Hypertension Mother         ESSENTIAL    Glaucoma Mother     Macular degeneration Mother     Glaucoma Father     Multiple sclerosis Sister       Social History:     Social History     Socioeconomic History    Marital status: Single     Spouse name: None    Number of children: None    Years of education: None    Highest education level: None   Occupational History    None   Tobacco Use    Smoking status: Never Smoker    Smokeless tobacco: Never Used   Vaping Use    Vaping Use: Never used   Substance and Sexual Activity    Alcohol use: Yes     Comment: social    Drug use: None    Sexual activity: None   Other Topics Concern    None   Social History Narrative    None     Social Determinants of Health     Financial Resource Strain:     Difficulty of Paying Living Expenses:    Food Insecurity:     Worried About Running Out of Food in the Last Year:     Ran Out of Food in the Last Year:    Transportation Needs:     Lack of Transportation (Medical):      Lack of Transportation (Non-Medical):    Physical Activity:     Days of Exercise per Week:     Minutes of Exercise per Session:    Stress:     Feeling of Stress :    Social Connections:     Frequency of Communication with Friends and Family:     Frequency of Social Gatherings with Friends and Family:     Attends Judaism Services:     Active Member of Clubs or Organizations:     Attends Club or Organization Meetings:     Marital Status:    Intimate Partner Violence:     Fear of Current or Ex-Partner:     Emotionally Abused:     Physically Abused:     Sexually Abused:       Medications and Allergies:     Current Outpatient Medications   Medication Sig Dispense Refill    aspirin 81 MG tablet Take 81 mg by mouth daily      Cholecalciferol (VITAMIN D3) 400 units CAPS Take 400 Units by mouth       losartan (COZAAR) 50 mg tablet Take 1 tablet (50 mg total) by mouth daily 90 tablet 1    LUTEIN-ZEAXANTHIN PO Take by mouth 20mg of Lutein and 1mg of Zeaxanthin      Multiple Vitamins-Minerals (MULTIVITAMIN WITH MINERALS) tablet Take 1 tablet by mouth   rosuvastatin (CRESTOR) 5 mg tablet Take 1 tablet (5 mg total) by mouth daily 90 tablet 2     No current facility-administered medications for this visit  Allergies   Allergen Reactions    Ezetimibe-Simvastatin      [facility] - [id number]: myalgia    Other      Pentothal KIT      Immunizations:     Immunization History   Administered Date(s) Administered    Influenza Quadrivalent, 6-35 Months IM 11/17/2015    Influenza Split High Dose Preservative Free IM 10/10/2017    Influenza, high dose seasonal 0 7 mL 10/02/2018, 10/09/2019, 10/30/2020    Influenza, seasonal, injectable 09/26/2012, 10/02/2014    Pneumococcal Conjugate 13-Valent 05/16/2016    Pneumococcal Polysaccharide PPV23 12/11/2017    SARS-CoV-2 / COVID-19 mRNA IM (Pfizer-BioNTech) 03/19/2021, 04/13/2021    Td (adult), adsorbed 05/25/2015    Tdap 05/05/2010    Zoster 04/30/2015      Health Maintenance:         Topic Date Due    Hepatitis C Screening  01/10/2020 (Originally 1950)    Colorectal Cancer Screening  01/29/2023         Topic Date Due    Influenza Vaccine (1) 09/01/2021      Medicare Screening Tests and Risk Assessments:     Mercedes Gonzalez is here for his Subsequent Wellness visit  Last Medicare Wellness visit information reviewed, patient interviewed, no change since last AWV  Health Risk Assessment:   Patient rates overall health as very good  Patient feels that their physical health rating is much better  Patient is satisfied with their life  Eyesight was rated as same  Hearing was rated as same  Patient feels that their emotional and mental health rating is same  Patients states they are sometimes angry  Patient states they are sometimes unusually tired/fatigued  Pain experienced in the last 7 days has been none  Patient states that he has experienced no weight loss or gain in last 6 months       Depression Screening:   PHQ-2 Score: 0      Fall Risk Screening: In the past year, patient has experienced: no history of falling in past year      Home Safety:  Patient does not have trouble with stairs inside or outside of their home  Patient has working smoke alarms and has working carbon monoxide detector  Home safety hazards include: none  Nutrition:   Current diet is Regular  Medications:   Patient is currently taking over-the-counter supplements  OTC medications include: see medication list  Patient is able to manage medications  Activities of Daily Living (ADLs)/Instrumental Activities of Daily Living (IADLs):   Walk and transfer into and out of bed and chair?: Yes  Dress and groom yourself?: Yes    Bathe or shower yourself?: Yes    Feed yourself? Yes  Do your laundry/housekeeping?: Yes  Manage your money, pay your bills and track your expenses?: Yes  Make your own meals?: Yes    Do your own shopping?: Yes    Previous Hospitalizations:   Any hospitalizations or ED visits within the last 12 months?: No      Advance Care Planning:   Living will: Yes    Durable POA for healthcare:  Yes    Advanced directive: Yes      Cognitive Screening:   Provider or family/friend/caregiver concerned regarding cognition?: No    PREVENTIVE SCREENINGS      Cardiovascular Screening:    General: Screening Not Indicated and History Lipid Disorder      Diabetes Screening:     General: Screening Current      Colorectal Cancer Screening:     General: Screening Current      Prostate Cancer Screening:    General: Screening Current      Osteoporosis Screening:    General: Screening Not Indicated      Abdominal Aortic Aneurysm (AAA) Screening:    Risk factors include: age between 73-67 yo        Lung Cancer Screening:     General: Screening Not Indicated      Hepatitis C Screening:    General: Screening Current    Screening, Brief Intervention, and Referral to Treatment (SBIRT)    Screening    Typical number of drinks in a week: 3    Single Item Drug Screening:  How often have you used an illegal drug (including marijuana) or a prescription medication for non-medical reasons in the past year? never    Single Item Drug Screen Score: 0  Interpretation: Negative screen for possible drug use disorder    No exam data present     Physical Exam:     /72 (BP Location: Left arm, Patient Position: Sitting, Cuff Size: Adult)   Pulse 98   Temp 97 6 °F (36 4 °C) (Temporal)   Resp 16   Ht 5' 7" (1 702 m)   Wt 72 7 kg (160 lb 3 2 oz)   SpO2 99%   BMI 25 09 kg/m²     Physical Exam     Meredith Manriquez, DO

## 2021-07-14 NOTE — PROGRESS NOTES
Assessment/Plan:     Medicare wellness completed  Patient has living will and power-of-  Blood pressure stable  Total cholesterol 178 with an LDL cholesterol of 95  CMP stable  Next colonoscopy due in 2022  Most recent 1 was 2019  Recheck 6 months with repeat labs  Continue present medications  PSA only change by 0 3 since last year  Diagnoses and all orders for this visit:    Healthcare maintenance    Pure hypercholesterolemia  -     Discontinue: rosuvastatin (CRESTOR) 5 mg tablet; Take 1 tablet (5 mg total) by mouth daily  -     rosuvastatin (CRESTOR) 5 mg tablet; Take 1 tablet (5 mg total) by mouth daily  -     Lipid panel; Future  -     Comprehensive metabolic panel; Future    Benign hypertensive CKD, 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 chronic kidney disease    Chronic kidney disease, stage 2 (mild)        1  Healthcare maintenance     2  Pure hypercholesterolemia  rosuvastatin (CRESTOR) 5 mg tablet    Lipid panel    Comprehensive metabolic panel    DISCONTINUED: rosuvastatin (CRESTOR) 5 mg tablet   3  Benign hypertensive CKD, 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 chronic kidney disease     4  Chronic kidney disease, stage 2 (mild)         Subjective:        Patient ID: Atlee Hatchet is a 73 y o  male    Chief Complaint   Patient presents with   [facility] Wellness Visit       HPI    The following portions of the patient's history were reviewed and updated as appropriate: past medical history, past surgical history and problem list       Review of Systems      Objective:  /72 (BP Location: Left arm, Patient Position: Sitting, Cuff Size: Adult)   Pulse 98   Temp 97 6 °F (36 4 °C) (Temporal)   Resp 16   Ht 5' 7" (1 702 m)   Wt 72 7 kg (160 lb 3 2 oz)   SpO2 99%   BMI 25 09 kg/m²        Physical Exam

## 2021-08-02 ENCOUNTER — RA CDI HCC (OUTPATIENT)
Dept: OTHER | Facility: HOSPITAL | Age: 71
End: 2021-08-02

## 2021-08-09 ENCOUNTER — OFFICE VISIT (OUTPATIENT)
Dept: FAMILY MEDICINE CLINIC | Facility: CLINIC | Age: 71
End: 2021-08-09
Payer: COMMERCIAL

## 2021-08-09 VITALS
TEMPERATURE: 97.7 F | HEIGHT: 67 IN | RESPIRATION RATE: 16 BRPM | DIASTOLIC BLOOD PRESSURE: 72 MMHG | BODY MASS INDEX: 25.52 KG/M2 | WEIGHT: 162.6 LBS | SYSTOLIC BLOOD PRESSURE: 110 MMHG | OXYGEN SATURATION: 96 % | HEART RATE: 108 BPM

## 2021-08-09 DIAGNOSIS — I12.9 BENIGN HYPERTENSIVE CKD, STAGE 1-4 OR UNSPECIFIED CHRONIC KIDNEY DISEASE: ICD-10-CM

## 2021-08-09 DIAGNOSIS — N18.2 CHRONIC KIDNEY DISEASE, STAGE 2 (MILD): ICD-10-CM

## 2021-08-09 DIAGNOSIS — H25.9 SENILE CATARACT OF LEFT EYE, UNSPECIFIED AGE-RELATED CATARACT TYPE: Primary | ICD-10-CM

## 2021-08-09 PROCEDURE — 3008F BODY MASS INDEX DOCD: CPT | Performed by: FAMILY MEDICINE

## 2021-08-09 PROCEDURE — 1036F TOBACCO NON-USER: CPT | Performed by: FAMILY MEDICINE

## 2021-08-09 PROCEDURE — 1160F RVW MEDS BY RX/DR IN RCRD: CPT | Performed by: FAMILY MEDICINE

## 2021-08-09 PROCEDURE — 99214 OFFICE O/P EST MOD 30 MIN: CPT | Performed by: FAMILY MEDICINE

## 2021-08-09 NOTE — PROGRESS NOTES
Subjective:      Mahogany Rendon is a 73 y o  male who presents to the office today for a preoperative consultation at the request of surgeon Dr Josue Nixon who plans on performing L CAT on August 24  Planned anesthesia is MAC  The patient has the following known anesthesia issues: none  Patient has a bleeding risk of: no recent abnormal bleeding  Review of Systems  Review of Systems   Constitutional: Negative for appetite change, fatigue, fever and unexpected weight change  HENT: Negative for congestion, ear pain, postnasal drip, rhinorrhea, sinus pressure, sinus pain and sore throat  Eyes: Positive for visual disturbance  Negative for redness  Left-sided blurred vision due to  cataract   Respiratory: Negative for chest tightness and shortness of breath  Cardiovascular: Negative for chest pain, palpitations and leg swelling  Gastrointestinal: Negative for abdominal distention, abdominal pain, diarrhea and nausea  Endocrine: Negative for cold intolerance and heat intolerance  Genitourinary: Negative for dysuria and hematuria  Musculoskeletal: Negative for arthralgias, gait problem and myalgias  Skin: Negative for pallor and rash  Neurological: Negative for dizziness and headaches  Psychiatric/Behavioral: Negative for behavioral problems  The patient is not nervous/anxious  Objective:      Physical Exam    /72 (BP Location: Left arm, Patient Position: Sitting, Cuff Size: Adult)   Pulse (!) 108   Temp 97 7 °F (36 5 °C) (Temporal)   Resp 16   Ht 5' 7" (1 702 m)   Wt 73 8 kg (162 lb 9 6 oz)   SpO2 96%   BMI 25 47 kg/m²     Physical Exam  Vitals and nursing note reviewed  Constitutional:       General: He is not in acute distress  HENT:      Head: Normocephalic  Eyes:      General: No scleral icterus  Pupils: Pupils are equal, round, and reactive to light  Cardiovascular:      Rate and Rhythm: Normal rate and regular rhythm        Heart sounds: Normal heart unspecified chronic kidney disease    Chronic kidney disease, stage 2 (mild)    Hyperlipidemia    Vitamin D deficiency     Social History     Socioeconomic History    Marital status: Single     Spouse name: None    Number of children: None    Years of education: None    Highest education level: None   Occupational History    None   Tobacco Use    Smoking status: Never Smoker    Smokeless tobacco: Never Used   Vaping Use    Vaping Use: Never used   Substance and Sexual Activity    Alcohol use: Yes     Comment: social    Drug use: None    Sexual activity: None   Other Topics Concern    None   Social History Narrative    None     Social Determinants of Health     Financial Resource Strain:     Difficulty of Paying Living Expenses:    Food Insecurity:     Worried About Running Out of Food in the Last Year:     Ran Out of Food in the Last Year:    Transportation Needs:     Lack of Transportation (Medical):      Lack of Transportation (Non-Medical):    Physical Activity:     Days of Exercise per Week:     Minutes of Exercise per Session:    Stress:     Feeling of Stress :    Social Connections:     Frequency of Communication with Friends and Family:     Frequency of Social Gatherings with Friends and Family:     Attends Tenriism Services:     Active Member of Clubs or Organizations:     Attends Club or Organization Meetings:     Marital Status:    Intimate Partner Violence:     Fear of Current or Ex-Partner:     Emotionally Abused:     Physically Abused:     Sexually Abused:        Current Outpatient Medications:     aspirin 81 MG tablet, Take 81 mg by mouth daily, Disp: , Rfl:     Cholecalciferol (VITAMIN D3) 400 units CAPS, Take 400 Units by mouth , Disp: , Rfl:     losartan (COZAAR) 50 mg tablet, Take 1 tablet (50 mg total) by mouth daily, Disp: 90 tablet, Rfl: 1    LUTEIN-ZEAXANTHIN PO, Take by mouth 20mg of Lutein and 1mg of Zeaxanthin, Disp: , Rfl:     Multiple Vitamins-Minerals (MULTIVITAMIN WITH MINERALS) tablet, Take 1 tablet by mouth , Disp: , Rfl:     rosuvastatin (CRESTOR) 5 mg tablet, Take 1 tablet (5 mg total) by mouth daily, Disp: 90 tablet, Rfl: 2  Lab Results   Component Value Date    WBC 7 75 05/11/2017    HGB 14 7 05/11/2017    HCT 44 3 05/11/2017    MCV 94 05/11/2017     05/11/2017     Lab Results   Component Value Date     11/10/2015    K 4 3 06/29/2021     (H) 06/29/2021    CO2 22 06/29/2021    ANIONGAP 7 11/10/2015    BUN 15 06/29/2021    CREATININE 1 01 06/29/2021    GLUCOSE 99 11/10/2015    GLUF 96 06/29/2021    CALCIUM 9 3 06/29/2021    CORRECTEDCA 9 8 06/29/2021    AST 23 06/29/2021    ALT 29 06/29/2021    ALKPHOS 100 06/29/2021    PROT 7 7 10/12/2015    BILITOT 0 70 10/12/2015    EGFR 76 06/29/2021     No results found for: ERNST        [unfilled]    Current Outpatient Medications:     aspirin 81 MG tablet, Take 81 mg by mouth daily, Disp: , Rfl:     Cholecalciferol (VITAMIN D3) 400 units CAPS, Take 400 Units by mouth , Disp: , Rfl:     losartan (COZAAR) 50 mg tablet, Take 1 tablet (50 mg total) by mouth daily, Disp: 90 tablet, Rfl: 1    LUTEIN-ZEAXANTHIN PO, Take by mouth 20mg of Lutein and 1mg of Zeaxanthin, Disp: , Rfl:     Multiple Vitamins-Minerals (MULTIVITAMIN WITH MINERALS) tablet, Take 1 tablet by mouth , Disp: , Rfl:     rosuvastatin (CRESTOR) 5 mg tablet, Take 1 tablet (5 mg total) by mouth daily, Disp: 90 tablet, Rfl: 2  Allergies   Allergen Reactions    Ezetimibe-Simvastatin      [facility] - [id number]: myalgia    Other      Pentothal KIT

## 2021-09-21 DIAGNOSIS — I10 ESSENTIAL HYPERTENSION: ICD-10-CM

## 2021-09-21 RX ORDER — LOSARTAN POTASSIUM 50 MG/1
50 TABLET ORAL DAILY
Qty: 90 TABLET | Refills: 1 | Status: SHIPPED | OUTPATIENT
Start: 2021-09-21 | End: 2022-03-21 | Stop reason: SDUPTHER

## 2021-10-13 ENCOUNTER — IMMUNIZATIONS (OUTPATIENT)
Dept: FAMILY MEDICINE CLINIC | Facility: CLINIC | Age: 71
End: 2021-10-13
Payer: COMMERCIAL

## 2021-10-13 DIAGNOSIS — Z23 ENCOUNTER FOR IMMUNIZATION: Primary | ICD-10-CM

## 2021-10-13 PROCEDURE — G0008 ADMIN INFLUENZA VIRUS VAC: HCPCS

## 2021-10-13 PROCEDURE — 90662 IIV NO PRSV INCREASED AG IM: CPT

## 2022-01-03 ENCOUNTER — APPOINTMENT (OUTPATIENT)
Dept: LAB | Facility: CLINIC | Age: 72
End: 2022-01-03
Payer: COMMERCIAL

## 2022-01-03 ENCOUNTER — RA CDI HCC (OUTPATIENT)
Dept: OTHER | Facility: HOSPITAL | Age: 72
End: 2022-01-03

## 2022-01-03 DIAGNOSIS — E78.00 PURE HYPERCHOLESTEROLEMIA: ICD-10-CM

## 2022-01-03 LAB
ALBUMIN SERPL BCP-MCNC: 3.8 G/DL (ref 3.5–5)
ALP SERPL-CCNC: 107 U/L (ref 46–116)
ALT SERPL W P-5'-P-CCNC: 31 U/L (ref 12–78)
ANION GAP SERPL CALCULATED.3IONS-SCNC: 4 MMOL/L (ref 4–13)
AST SERPL W P-5'-P-CCNC: 20 U/L (ref 5–45)
BILIRUB SERPL-MCNC: 0.69 MG/DL (ref 0.2–1)
BUN SERPL-MCNC: 17 MG/DL (ref 5–25)
CALCIUM SERPL-MCNC: 9.9 MG/DL (ref 8.3–10.1)
CHLORIDE SERPL-SCNC: 109 MMOL/L (ref 100–108)
CHOLEST SERPL-MCNC: 192 MG/DL
CO2 SERPL-SCNC: 27 MMOL/L (ref 21–32)
CREAT SERPL-MCNC: 1.19 MG/DL (ref 0.6–1.3)
GFR SERPL CREATININE-BSD FRML MDRD: 61 ML/MIN/1.73SQ M
GLUCOSE P FAST SERPL-MCNC: 84 MG/DL (ref 65–99)
HDLC SERPL-MCNC: 51 MG/DL
LDLC SERPL CALC-MCNC: 114 MG/DL (ref 0–100)
NONHDLC SERPL-MCNC: 141 MG/DL
POTASSIUM SERPL-SCNC: 4.6 MMOL/L (ref 3.5–5.3)
PROT SERPL-MCNC: 7.3 G/DL (ref 6.4–8.2)
SODIUM SERPL-SCNC: 140 MMOL/L (ref 136–145)
TRIGL SERPL-MCNC: 134 MG/DL

## 2022-01-03 PROCEDURE — 36415 COLL VENOUS BLD VENIPUNCTURE: CPT

## 2022-01-03 PROCEDURE — 80053 COMPREHEN METABOLIC PANEL: CPT

## 2022-01-03 PROCEDURE — 80061 LIPID PANEL: CPT

## 2022-01-03 NOTE — PROGRESS NOTES
[facility] 75  coding opportunities       Chart reviewed, no opportunity found: CHART REVIEWED, NO OPPORTUNITY FOUND                        Patients insurance company:  [employer] [facility] (Medicare Advantage and Commercial)

## 2022-01-10 ENCOUNTER — OFFICE VISIT (OUTPATIENT)
Dept: FAMILY MEDICINE CLINIC | Facility: CLINIC | Age: 72
End: 2022-01-10
Payer: COMMERCIAL

## 2022-01-10 VITALS
OXYGEN SATURATION: 98 % | BODY MASS INDEX: 25.39 KG/M2 | HEIGHT: 67 IN | HEART RATE: 103 BPM | SYSTOLIC BLOOD PRESSURE: 120 MMHG | RESPIRATION RATE: 16 BRPM | DIASTOLIC BLOOD PRESSURE: 80 MMHG | WEIGHT: 161.8 LBS | TEMPERATURE: 97.6 F

## 2022-01-10 DIAGNOSIS — Z12.5 PROSTATE CANCER SCREENING: ICD-10-CM

## 2022-01-10 DIAGNOSIS — E78.00 PURE HYPERCHOLESTEROLEMIA: Primary | ICD-10-CM

## 2022-01-10 DIAGNOSIS — I12.9 BENIGN HYPERTENSIVE CKD, STAGE 1-4 OR UNSPECIFIED CHRONIC KIDNEY DISEASE: ICD-10-CM

## 2022-01-10 DIAGNOSIS — N18.2 CHRONIC KIDNEY DISEASE, STAGE 2 (MILD): ICD-10-CM

## 2022-01-10 PROCEDURE — 3008F BODY MASS INDEX DOCD: CPT | Performed by: FAMILY MEDICINE

## 2022-01-10 PROCEDURE — 1036F TOBACCO NON-USER: CPT | Performed by: FAMILY MEDICINE

## 2022-01-10 PROCEDURE — 99213 OFFICE O/P EST LOW 20 MIN: CPT | Performed by: FAMILY MEDICINE

## 2022-01-10 PROCEDURE — 1160F RVW MEDS BY RX/DR IN RCRD: CPT | Performed by: FAMILY MEDICINE

## 2022-01-10 RX ORDER — IBUPROFEN 600 MG/1
TABLET ORAL
COMMUNITY
Start: 2021-12-10 | End: 2022-07-11

## 2022-01-10 RX ORDER — AMOXICILLIN 500 MG/1
TABLET, FILM COATED ORAL
COMMUNITY
Start: 2021-12-10 | End: 2022-07-11

## 2022-01-21 NOTE — PROGRESS NOTES
Assessment/Plan:    Blood pressure past kidney function all stable  Continue present medications  Recheck 6 months with repeat labs at that time including PSA  COVID vaccine up-to-date  Diagnoses and all orders for this visit:    Pure hypercholesterolemia  -     Lipid panel; Future  -     Comprehensive metabolic panel; Future    Benign hypertensive CKD, 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 chronic kidney disease    Chronic kidney disease, stage 2 (mild)    Prostate cancer screening  -     PSA, Total Screen; Future    Other orders  -     amoxicillin (AMOXIL) 500 MG tablet  -     ibuprofen (MOTRIN) 600 mg tablet        1  Pure hypercholesterolemia  Lipid panel    Comprehensive metabolic panel   2  Benign hypertensive CKD, 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 chronic kidney disease     3  Chronic kidney disease, stage 2 (mild)     4  Prostate cancer screening  PSA, Total Screen       Subjective:        Patient ID: Jose Antonio Donnelly is a 73 y o  male  Chief Complaint   Patient presents with    Follow-up     6 month follow up, patient states his heart rate is elevated        Blood pressure lab check      The following portions of the patient's history were reviewed and updated as appropriate: past medical history, past surgical history and problem list       Review of Systems   Constitutional: Negative for fatigue  Eyes: Negative for visual disturbance  Respiratory: Negative for cough and shortness of breath  Cardiovascular: Negative for chest pain, palpitations and leg swelling  Gastrointestinal: Negative for abdominal pain  Neurological: Negative for dizziness and headaches  Psychiatric/Behavioral: The patient is not nervous/anxious            Objective:  /80 (BP Location: Left arm, Patient Position: Sitting, Cuff Size: Adult)   Pulse 103   Temp 97 6 °F (36 4 °C) (Temporal)   Resp 16   Ht 5' 7" (1 702 m)   Wt 73 4 kg (161 lb 12 8 oz)   SpO2 98%   BMI 25 34 kg/m²        Physical Exam  Vitals and nursing note reviewed  Constitutional:       General: He is not in acute distress  HENT:      Head: Normocephalic  Right Ear: Tympanic membrane normal       Left Ear: Tympanic membrane normal    Eyes:      General: No scleral icterus  Pupils: Pupils are equal, round, and reactive to light  Cardiovascular:      Heart sounds: Normal heart sounds  No murmur heard  Pulmonary:      Breath sounds: Normal breath sounds  Lymphadenopathy:      Cervical: No cervical adenopathy  Skin:     Coloration: Skin is not pale  Neurological:      Mental Status: He is alert and oriented to person, place, and time  Psychiatric:         Behavior: Behavior normal          Thought Content:  Thought content normal

## 2022-03-21 DIAGNOSIS — I10 ESSENTIAL HYPERTENSION: ICD-10-CM

## 2022-03-21 RX ORDER — LOSARTAN POTASSIUM 50 MG/1
50 TABLET ORAL DAILY
Qty: 90 TABLET | Refills: 1 | Status: SHIPPED | OUTPATIENT
Start: 2022-03-21

## 2022-06-08 DIAGNOSIS — E78.00 PURE HYPERCHOLESTEROLEMIA: ICD-10-CM

## 2022-06-08 RX ORDER — ROSUVASTATIN CALCIUM 5 MG/1
TABLET, COATED ORAL
Qty: 90 TABLET | Refills: 1 | Status: SHIPPED | OUTPATIENT
Start: 2022-06-08

## 2022-06-17 ENCOUNTER — RA CDI HCC (OUTPATIENT)
Dept: OTHER | Facility: HOSPITAL | Age: 72
End: 2022-06-17

## 2022-06-17 NOTE — PROGRESS NOTES
[facility] 75  coding opportunities       Chart reviewed, no opportunity found:   [address]        Patients Insurance     Medicare Insurance: [employer] Advantage

## 2022-07-07 ENCOUNTER — APPOINTMENT (OUTPATIENT)
Dept: LAB | Facility: CLINIC | Age: 72
End: 2022-07-07
Payer: COMMERCIAL

## 2022-07-07 DIAGNOSIS — E78.00 PURE HYPERCHOLESTEROLEMIA: ICD-10-CM

## 2022-07-07 DIAGNOSIS — Z12.5 PROSTATE CANCER SCREENING: ICD-10-CM

## 2022-07-07 LAB
ALBUMIN SERPL BCP-MCNC: 3.5 G/DL (ref 3.5–5)
ALP SERPL-CCNC: 90 U/L (ref 46–116)
ALT SERPL W P-5'-P-CCNC: 24 U/L (ref 12–78)
ANION GAP SERPL CALCULATED.3IONS-SCNC: 6 MMOL/L (ref 4–13)
AST SERPL W P-5'-P-CCNC: 22 U/L (ref 5–45)
BILIRUB SERPL-MCNC: 0.6 MG/DL (ref 0.2–1)
BUN SERPL-MCNC: 17 MG/DL (ref 5–25)
CALCIUM SERPL-MCNC: 9.1 MG/DL (ref 8.3–10.1)
CHLORIDE SERPL-SCNC: 111 MMOL/L (ref 100–108)
CHOLEST SERPL-MCNC: 153 MG/DL
CO2 SERPL-SCNC: 23 MMOL/L (ref 21–32)
CREAT SERPL-MCNC: 1.12 MG/DL (ref 0.6–1.3)
GFR SERPL CREATININE-BSD FRML MDRD: 66 ML/MIN/1.73SQ M
GLUCOSE P FAST SERPL-MCNC: 90 MG/DL (ref 65–99)
HDLC SERPL-MCNC: 52 MG/DL
LDLC SERPL CALC-MCNC: 80 MG/DL (ref 0–100)
NONHDLC SERPL-MCNC: 101 MG/DL
POTASSIUM SERPL-SCNC: 4.1 MMOL/L (ref 3.5–5.3)
PROT SERPL-MCNC: 6.9 G/DL (ref 6.4–8.2)
PSA SERPL-MCNC: 3.7 NG/ML (ref 0–4)
SODIUM SERPL-SCNC: 140 MMOL/L (ref 136–145)
TRIGL SERPL-MCNC: 105 MG/DL

## 2022-07-07 PROCEDURE — 80053 COMPREHEN METABOLIC PANEL: CPT

## 2022-07-07 PROCEDURE — 36415 COLL VENOUS BLD VENIPUNCTURE: CPT

## 2022-07-07 PROCEDURE — G0103 PSA SCREENING: HCPCS

## 2022-07-07 PROCEDURE — 80061 LIPID PANEL: CPT

## 2022-07-11 ENCOUNTER — OFFICE VISIT (OUTPATIENT)
Dept: FAMILY MEDICINE CLINIC | Facility: CLINIC | Age: 72
End: 2022-07-11
Payer: COMMERCIAL

## 2022-07-11 VITALS
HEART RATE: 107 BPM | RESPIRATION RATE: 16 BRPM | WEIGHT: 156 LBS | HEIGHT: 67 IN | SYSTOLIC BLOOD PRESSURE: 140 MMHG | BODY MASS INDEX: 24.48 KG/M2 | DIASTOLIC BLOOD PRESSURE: 78 MMHG | TEMPERATURE: 97.7 F | OXYGEN SATURATION: 97 %

## 2022-07-11 DIAGNOSIS — Z00.00 MEDICARE ANNUAL WELLNESS VISIT, SUBSEQUENT: Primary | ICD-10-CM

## 2022-07-11 DIAGNOSIS — E78.00 PURE HYPERCHOLESTEROLEMIA: ICD-10-CM

## 2022-07-11 DIAGNOSIS — R97.20 ELEVATED PSA: ICD-10-CM

## 2022-07-11 DIAGNOSIS — I10 PRIMARY HYPERTENSION: ICD-10-CM

## 2022-07-11 PROCEDURE — 99213 OFFICE O/P EST LOW 20 MIN: CPT | Performed by: FAMILY MEDICINE

## 2022-07-11 PROCEDURE — G0439 PPPS, SUBSEQ VISIT: HCPCS | Performed by: FAMILY MEDICINE

## 2022-07-11 PROCEDURE — 1160F RVW MEDS BY RX/DR IN RCRD: CPT | Performed by: FAMILY MEDICINE

## 2022-07-11 PROCEDURE — 3288F FALL RISK ASSESSMENT DOCD: CPT | Performed by: FAMILY MEDICINE

## 2022-07-11 PROCEDURE — 1170F FXNL STATUS ASSESSED: CPT | Performed by: FAMILY MEDICINE

## 2022-07-11 PROCEDURE — 3725F SCREEN DEPRESSION PERFORMED: CPT | Performed by: FAMILY MEDICINE

## 2022-07-11 PROCEDURE — 1125F AMNT PAIN NOTED PAIN PRSNT: CPT | Performed by: FAMILY MEDICINE

## 2022-07-11 NOTE — PROGRESS NOTES
Assessment and Plan:   Blood pressure stable  Home blood pressures better than would patient presents today  Blood pressures are typically in the low 130s over 70s  Feels well  Medicare wellness completed today  Has living will and power-of-  Problem List Items Addressed This Visit    None         Depression Screening and Follow-up Plan: Patient was screened for depression during today's encounter  They screened negative with a PHQ-2 score of 0  Preventive health issues were discussed with patient, and age appropriate screening tests were ordered as noted in patient's After Visit Summary  Personalized health advice and appropriate referrals for health education or preventive services given if needed, as noted in patient's After Visit Summary       History of Present Illness:     Patient presents for a Medicare Wellness Visit    HPI   Patient Care Team:  Ethan Dubose DO as PCP - General  Gabriele Lopez MD as Endoscopist     Review of Systems:     Review of Systems     Problem List:     Patient Active Problem List   Diagnosis    Benign hypertensive CKD, 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 chronic kidney disease    Chronic kidney disease, stage 2 (mild)    Hyperlipidemia    Vitamin D deficiency      Past Medical and Surgical History:     Past Medical History:   Diagnosis Date    Acute pain of right shoulder     [id number] RESOLVED    Anemia     [id number] RESOLVED    Colon polyp     Hyperlipemia     Hypertension      Past Surgical History:   Procedure Laterality Date    BASAL CELL CARCINOMA EXCISION  11/03/2018    on back    CATARACT EXTRACTION Right     CATARACT EXTRACTION Left 08/24/2021    COLONOSCOPY      EYE SURGERY      right     OH COLONOSCOPY FLX DX W/COLLJ SPEC WHEN PFRMD N/A 8/17/2016     COLONOSCOPY;  Surgeon: Gabriele Lopez 2016 - 2019 Amy Valdivia) - 2022      Family History:     Family History   Problem Relation Age of Onset    Other Mother         ATHEROSCLEROSIS    Kidney disease Mother CHRONIC     Hypertension Mother         ESSENTIAL    Glaucoma Mother     Macular degeneration Mother     Glaucoma Father     Multiple sclerosis Sister       Social History:     Social History     Socioeconomic History    Marital status: Single     Spouse name: Not on file    Number of children: Not on file    Years of education: Not on file    Highest education level: Not on file   Occupational History    Not on file   Tobacco Use    Smoking status: Never Smoker    Smokeless tobacco: Never Used   Vaping Use    Vaping Use: Never used   Substance and Sexual Activity    Alcohol use: Yes     Comment: social    Drug use: Not on file    Sexual activity: Not on file   Other Topics Concern    Not on file   Social History Narrative    Not on file     Social Determinants of Health     Financial Resource Strain: Not on file   Food Insecurity: Not on file   Transportation Needs: Not on file   Physical Activity: Not on file   Stress: Not on file   Social Connections: Not on file   Intimate Partner Violence: Not on file   Housing Stability: Not on file      Medications and Allergies:     Current Outpatient Medications   Medication Sig Dispense Refill    amoxicillin (AMOXIL) 500 MG tablet       aspirin 81 MG tablet Take 81 mg by mouth daily      Cholecalciferol (VITAMIN D3) 400 units CAPS Take 400 Units by mouth       ibuprofen (MOTRIN) 600 mg tablet       losartan (COZAAR) 50 mg tablet Take 1 tablet (50 mg total) by mouth daily 90 tablet 1    LUTEIN-ZEAXANTHIN PO Take by mouth 20mg of Lutein and 1mg of Zeaxanthin      Multiple Vitamins-Minerals (MULTIVITAMIN WITH MINERALS) tablet Take 1 tablet by mouth   rosuvastatin (CRESTOR) 5 mg tablet TAKE 1 TABLET BY MOUTH EVERY DAY 90 tablet 1     No current facility-administered medications for this visit       Allergies   Allergen Reactions    Ezetimibe-Simvastatin      Annotation - [id number]: myalgia    Other      Pentothal KIT      Immunizations: Immunization History   Administered Date(s) Administered    COVID-19 PFIZER VACCINE 0 3 ML IM 03/19/2021, 04/13/2021, 11/19/2021    Influenza Quadrivalent, 6-35 Months IM 11/17/2015    Influenza Split High Dose Preservative Free IM 10/10/2017    Influenza, high dose seasonal 0 7 mL 10/02/2018, 10/09/2019, 10/30/2020, 10/13/2021    Influenza, seasonal, injectable 09/26/2012, 10/02/2014    Pneumococcal Conjugate 13-Valent 05/16/2016    Pneumococcal Polysaccharide PPV23 12/11/2017    Td (adult), adsorbed 05/25/2015    Tdap 05/05/2010    Zoster 04/30/2015      Health Maintenance:         Topic Date Due    Hepatitis C Screening  Never done    Colorectal Cancer Screening  01/29/2023         Topic Date Due    COVID-19 Vaccine (4 - Booster for Pablo Swann series) 03/19/2022    Influenza Vaccine (1) 09/01/2022      Medicare Screening Tests and Risk Assessments:     Juan J Castro is here for his Subsequent Wellness visit  Last Medicare Wellness visit information reviewed, patient interviewed, no change since last AWV  Health Risk Assessment:   Patient rates overall health as good  Patient feels that their physical health rating is same  Patient is very satisfied with their life  Eyesight was rated as same  Hearing was rated as slightly worse  Patient feels that their emotional and mental health rating is same  Patients states they are never, rarely angry  Patient states they are never, rarely unusually tired/fatigued  Pain experienced in the last 7 days has been none  Patient states that he has experienced no weight loss or gain in last 6 months  Depression Screening:   PHQ-2 Score: 0      Fall Risk Screening: In the past year, patient has experienced: no history of falling in past year      Home Safety:  Patient does not have trouble with stairs inside or outside of their home  Patient has working smoke alarms and has working carbon monoxide detector  Home safety hazards include: none       Nutrition:   Current diet is Regular  Medications:   Patient is currently taking over-the-counter supplements  OTC medications include: see medication list  Patient is able to manage medications  Activities of Daily Living (ADLs)/Instrumental Activities of Daily Living (IADLs):   Walk and transfer into and out of bed and chair?: Yes  Dress and groom yourself?: Yes    Bathe or shower yourself?: Yes    Feed yourself? Yes  Do your laundry/housekeeping?: Yes  Manage your money, pay your bills and track your expenses?: Yes  Make your own meals?: Yes    Do your own shopping?: Yes    Previous Hospitalizations:   Any hospitalizations or ED visits within the last 12 months?: No      Advance Care Planning:   Living will: Yes    Durable POA for healthcare: Yes    Advanced directive: Yes      Cognitive Screening:   Provider or family/friend/caregiver concerned regarding cognition?: No    PREVENTIVE SCREENINGS      Cardiovascular Screening:    General: Screening Not Indicated and History Lipid Disorder      Diabetes Screening:     General: Screening Current      Colorectal Cancer Screening:     General: Screening Current      Prostate Cancer Screening:    General: Screening Current      Osteoporosis Screening:    General: Screening Not Indicated      Abdominal Aortic Aneurysm (AAA) Screening:    Risk factors include: age between 73-69 yo        Lung Cancer Screening:     General: Screening Not Indicated      Hepatitis C Screening:    General: Patient Declines    Screening, Brief Intervention, and Referral to Treatment (SBIRT)    Screening  Typical number of drinks in a day: 0  Typical number of drinks in a week: 0  Interpretation: Low risk drinking behavior      Single Item Drug Screening:  How often have you used an illegal drug (including marijuana) or a prescription medication for non-medical reasons in the past year? never    Single Item Drug Screen Score: 0  Interpretation: Negative screen for possible drug use disorder    No exam data present     Physical Exam:     There were no vitals taken for this visit      Physical Exam     Forest Folds, DO

## 2022-07-12 NOTE — PROGRESS NOTES
Assessment/Plan:    Blood pressure stable  Cholesterol stable  Continue present medications  Recheck 6 months  Diagnoses and all orders for this visit:    Medicare annual wellness visit, subsequent    Primary hypertension    Elevated PSA  -     PSA Total, Diagnostic; Future    Pure hypercholesterolemia  -     Lipid panel; Future  -     Comprehensive metabolic panel; Future        1  Medicare annual wellness visit, subsequent     2  Primary hypertension     3  Elevated PSA  PSA Total, Diagnostic   4  Pure hypercholesterolemia  Lipid panel    Comprehensive metabolic panel       Subjective:        Patient ID: Jessica Lindquist is a 81 y o  male    Chief Complaint   Patient presents with   [facility] Wellness Visit       HPI    The following portions of the patient's history were reviewed and updated as appropriate: past medical history, past surgical history and problem list       Review of Systems      Objective:  /78 (BP Location: Left arm, Patient Position: Sitting, Cuff Size: Adult)   Pulse (!) 107   Temp 97 7 °F (36 5 °C) (Temporal)   Resp 16   Ht 5' 7" (1 702 m)   Wt 70 8 kg (156 lb)   SpO2 97%   BMI 24 43 kg/m²        Physical Exam

## 2022-08-17 ENCOUNTER — TELEPHONE (OUTPATIENT)
Dept: FAMILY MEDICINE CLINIC | Facility: CLINIC | Age: 72
End: 2022-08-17

## 2022-08-22 ENCOUNTER — APPOINTMENT (OUTPATIENT)
Dept: LAB | Facility: CLINIC | Age: 72
End: 2022-08-22
Payer: COMMERCIAL

## 2022-08-22 DIAGNOSIS — R97.20 ELEVATED PSA: ICD-10-CM

## 2022-08-22 LAB — PSA SERPL-MCNC: 3.7 NG/ML (ref 0–4)

## 2022-08-22 PROCEDURE — 84153 ASSAY OF PSA TOTAL: CPT

## 2022-08-25 ENCOUNTER — TELEPHONE (OUTPATIENT)
Dept: FAMILY MEDICINE CLINIC | Facility: CLINIC | Age: 72
End: 2022-08-25

## 2022-08-26 NOTE — TELEPHONE ENCOUNTER
Please tell the patient that the PSA stated 3 7 which is exactly what was the last time  This is still 1 0 above the previous  Would recommend routine urological evaluation    See if willing to do

## 2022-08-30 ENCOUNTER — TELEPHONE (OUTPATIENT)
Dept: FAMILY MEDICINE CLINIC | Facility: CLINIC | Age: 72
End: 2022-08-30

## 2022-08-30 DIAGNOSIS — R97.20 ELEVATED PSA: Primary | ICD-10-CM

## 2022-08-30 NOTE — TELEPHONE ENCOUNTER
Spoke with patient and gave him the information and provided the phone number for [facility] as well and patient understood

## 2022-09-01 NOTE — TELEPHONE ENCOUNTER
Referral is entered-called pt and LM on voice mail with [phone] contact info

## 2022-09-07 ENCOUNTER — OFFICE VISIT (OUTPATIENT)
Dept: UROLOGY | Facility: CLINIC | Age: 72
End: 2022-09-07
Payer: COMMERCIAL

## 2022-09-07 VITALS
SYSTOLIC BLOOD PRESSURE: 120 MMHG | HEIGHT: 67 IN | HEART RATE: 104 BPM | DIASTOLIC BLOOD PRESSURE: 70 MMHG | WEIGHT: 155 LBS | BODY MASS INDEX: 24.33 KG/M2

## 2022-09-07 DIAGNOSIS — R97.20 ELEVATED PSA: Primary | ICD-10-CM

## 2022-09-07 PROCEDURE — 1160F RVW MEDS BY RX/DR IN RCRD: CPT | Performed by: NURSE PRACTITIONER

## 2022-09-07 PROCEDURE — 99202 OFFICE O/P NEW SF 15 MIN: CPT | Performed by: NURSE PRACTITIONER

## 2022-09-07 NOTE — ASSESSMENT & PLAN NOTE
· PSA 3 7 ( was 2 7 last year)  · Recheck psa in 6 months  · Follow up in 6 months, can cancel if psa returns to baseline or stays the same  · If psa continues to rise recommend prostate biopsy +/- MRI of prostate  · Follow up 6 months

## 2022-09-07 NOTE — PROGRESS NOTES
Assessment and plan:     Elevated PSA  · PSA 3 7 ( was 2 7 last year)  · Recheck psa in 6 months  · Follow up in 6 months, can cancel if psa returns to baseline or stays the same  · If psa continues to rise recommend prostate biopsy +/- MRI of prostate  · Follow up 6 months      LUDY Lou    History of Present Illness     Natalee Minor is a 81 y o  new patient who presents for elevated psa of 3 7  previously 2 7  denies family hx of prostate cancer  Reports prior normal rectal exams per his report  Component PSA, Total   Latest Ref Rng & Units 0 0 - 4 0 ng/mL   6/4/2018 2 3   6/10/2019 2 7   6/12/2020 2 4   6/29/2021 2 7   7/7/2022 3 7   8/22/2022 3 7     He denies any urinary symptoms  His AUA is 8  Denies gross hematuria  Has not had any kidney stones  He gets an intermittent "twinge" in his abdomen  Denies prior abdominal surgery  No testicular surgery or hernia repair  Laboratory     Lab Results   Component Value Date    BUN 17 07/07/2022    CREATININE 1 12 07/07/2022       No components found for: GFR    Lab Results   Component Value Date    GLUCOSE 99 11/10/2015    CALCIUM 9 1 07/07/2022     11/10/2015    K 4 1 07/07/2022    CO2 23 07/07/2022     (H) 07/07/2022       Lab Results   Component Value Date    WBC 7 75 05/11/2017    HGB 14 7 05/11/2017    HCT 44 3 05/11/2017    MCV 94 05/11/2017     05/11/2017       Lab Results   Component Value Date    PSA 3 7 08/22/2022    PSA 3 7 07/07/2022    PSA 2 7 06/29/2021       No results found for this or any previous visit (from the past 1 hour(s))  @RESULT(URINEMICROSCOPIC)@    @RESULT(URINECULTURE)@    Radiology       Review of Systems     Review of Systems   Constitutional: Negative for activity change, appetite change, chills, fatigue, fever and unexpected weight change  HENT: Negative for facial swelling  Eyes: Negative for discharge  Respiratory: Negative  Negative for cough and shortness of breath      Cardiovascular: Negative for chest pain and leg swelling  Gastrointestinal: Negative  Negative for abdominal distention, abdominal pain, constipation, diarrhea, nausea and vomiting  Endocrine: Negative  Genitourinary: Negative  Negative for decreased urine volume, difficulty urinating, dysuria, enuresis, flank pain, frequency, genital sores, hematuria, scrotal swelling, testicular pain and urgency  Nocturia x 1  Has frequency in the morning improves as day goes on   Musculoskeletal: Negative for back pain and myalgias  Skin: Negative for pallor and rash  Allergic/Immunologic: Negative  Negative for immunocompromised state  Neurological: Negative for facial asymmetry and speech difficulty  Psychiatric/Behavioral: Negative for agitation and confusion  AUA SYMPTOM SCORE    Flowsheet Row Most Recent Value   AUA SYMPTOM SCORE    How often have you had a sensation of not emptying your bladder completely after you finished urinating? 1   How often have you had to urinate again less than two hours after you finished urinating? 3   How often have you found you stopped and started again several times when you urinate? 1   How often have you found it difficult to postpone urination? 0   How often have you had a weak urinary stream? 2   How often have you had to push or strain to begin urination? 0   How many times did you most typically get up to urinate from the time you went to bed at night until the time you got up in the morning? 1   Quality of Life: If you were to spend the rest of your life with your urinary condition just the way it is now, how would you feel about that? 2   AUA SYMPTOM SCORE 8                Allergies     Allergies   Allergen Reactions    Ezetimibe-Simvastatin      Annotation - [id number]: myalgia    Other      Pentothal KIT       Physical Exam     Physical Exam  Vitals reviewed  Constitutional:       General: He is not in acute distress  Appearance: Normal appearance   He is normal weight  He is not ill-appearing, toxic-appearing or diaphoretic  HENT:      Head: Normocephalic and atraumatic  Eyes:      General: No scleral icterus  Cardiovascular:      Rate and Rhythm: Normal rate  Pulmonary:      Effort: Pulmonary effort is normal  No respiratory distress  Abdominal:      General: Abdomen is flat  There is no distension  Palpations: Abdomen is soft  Tenderness: There is no abdominal tenderness  There is no guarding or rebound  Genitourinary:     Penis: Circumcised  No hypospadias, erythema, tenderness, discharge, swelling or lesions  Testes:         Right: Mass, tenderness or swelling not present  Right testis is descended  Left: Mass, tenderness or swelling not present  Left testis is descended  Epididymis:      Right: Not inflamed  No tenderness  Left: Not inflamed  No tenderness  Comments: Prostate smooth nontender without appreciable nodules or indurations approximately 40 g  Musculoskeletal:         General: No swelling  Cervical back: Normal range of motion  Skin:     General: Skin is warm and dry  Coloration: Skin is not jaundiced or pale  Findings: No rash  Neurological:      General: No focal deficit present  Mental Status: He is alert and oriented to person, place, and time  Gait: Gait normal    Psychiatric:         Mood and Affect: Mood normal          Behavior: Behavior normal          Thought Content:  Thought content normal          Judgment: Judgment normal          Vital Signs     Vitals:    09/07/22 1154   BP: 120/70   BP Location: Left arm   Patient Position: Sitting   Cuff Size: Adult   Pulse: 104   Weight: 70 3 kg (155 lb)   Height: 5' 7" (1 702 m)       Current Medications       Current Outpatient Medications:     aspirin 81 MG tablet, Take 81 mg by mouth daily, Disp: , Rfl:     Cholecalciferol (VITAMIN D3) 400 units CAPS, Take 400 Units by mouth , Disp: , Rfl:     losartan (COZAAR) 50 mg tablet, Take 1 tablet (50 mg total) by mouth daily, Disp: 90 tablet, Rfl: 1    LUTEIN-ZEAXANTHIN PO, Take by mouth 20mg of Lutein and 1mg of Zeaxanthin, Disp: , Rfl:     Multiple Vitamins-Minerals (MULTIVITAMIN WITH MINERALS) tablet, Take 1 tablet by mouth , Disp: , Rfl:     rosuvastatin (CRESTOR) 5 mg tablet, TAKE 1 TABLET BY MOUTH EVERY DAY, Disp: 90 tablet, Rfl: 1    Active Problems     Patient Active Problem List   Diagnosis    Benign hypertensive CKD, 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 chronic kidney disease    Chronic kidney disease, stage 2 (mild)    Hyperlipidemia    Vitamin D deficiency    Elevated PSA       Past Medical History     Past Medical History:   Diagnosis Date    Acute pain of right shoulder     [id number] RESOLVED    Anemia     [id number] RESOLVED    Colon polyp     Hyperlipemia     Hypertension        Surgical History     Past Surgical History:   Procedure Laterality Date    BASAL CELL CARCINOMA EXCISION  11/03/2018    on back    CATARACT EXTRACTION Right     CATARACT EXTRACTION Left 08/24/2021    COLONOSCOPY      EYE SURGERY      right     MT COLONOSCOPY FLX DX W/COLLJ SPEC WHEN PFRMD N/A 8/17/2016     COLONOSCOPY;  Surgeon: Richa Callejas 2016 - 2019 ([facility] [place]) - 2022       Family History     Family History   Problem Relation Age of Onset    Other Mother         ATHEROSCLEROSIS    Kidney disease Mother         CHRONIC     Hypertension Mother         ESSENTIAL    Glaucoma Mother     Macular degeneration Mother     Glaucoma Father     Multiple sclerosis Sister        Social History     Social History     Social History     Tobacco Use   Smoking Status Never Smoker   Smokeless Tobacco Never Used       Past Surgical History:   Procedure Laterality Date    BASAL CELL CARCINOMA EXCISION  11/03/2018    on back    CATARACT EXTRACTION Right     CATARACT EXTRACTION Left 08/24/2021    COLONOSCOPY      EYE SURGERY      right     MT COLONOSCOPY FLX DX W/COLLJ SPEC WHEN PFRMD N/A 8/17/2016 COLONOSCOPY;  Surgeon: Humaira Gama 2016 - 2019 [facility] ) - 2022         The following portions of the patient's history were reviewed and updated as appropriate: allergies, current medications, past family history, past medical history, past social history, past surgical history and problem list    Please note :  Voice dictation software has been used to create this document  There may be inadvertent transcription errors      [id number] [address]

## 2022-09-07 NOTE — PATIENT INSTRUCTIONS
BLADDER HEALTH    WHAT IS CONSIDERED NORMAL? The average bladder can hold about 2 cups of urine before it needs to be emptied  The normal range of voiding urine is 6 to 8 times during a 24 hour period  As we get older, our bladder capacity can get smaller and we may need to pass urine more frequently but usually not more than every 2 hours  Urine should flow easily without discomfort in a good, steady stream until the bladder is empty  No pushing or straining is necessary to empty the bladder  An urge is a signal that you feel as the bladder stretches to fill with urine  Urges can be felt even if the bladder is not full  Urges are not commands to go to the toilet, merely a signal and can be controlled  WHAT ARE GOOD BLADDER HABITS? Take your time when emptying your bladder  Dont strain or push to empty your bladder  Make sure you empty your bladder completely each time you pass urine  Do not rush the process  Consistently ignoring the urge to go (waiting more than 4 hours between toileting) or urinating too infrequently may be convenient but not healthy for your bladder  Avoid going to the toilet just in case or more often than every 2 hours  It is usually not necessary to go when you feel the first urge  Try to go only when your bladder is full  Urgency and frequency of urination can be improved by retraining the bladder and spacing your fluid intake throughout the day  Practice good toilet habits  Dont let your bladder control your life  TIPS TO MAINTAIN GOOD BLADDER HABITS  Maintain a good fluid intake  Depending on your body size and environment, drink 6 -8 cups (8 oz each) of fluid per day unless otherwise advised by your doctor  Not enough fluid creates a foul odor and dark color of the urine  Limit the amount of caffeine (coffee, cola, chocolate or tea) and citrus foods that you consume as these foods can be associated with increased sensation of urinary urgency and frequency  Limit the amount of alcohol you drink  Alcohol increases urine production and makes it difficult for the brain to coordinate bladder control  Avoid constipation by maintaining a balanced diet of dietary fiber  Cigarette smoking is also irritating to the bladder surface and is associated with bladder cancer  In addition, the coughing associated with smoking may lead to increased incontinent episodes because of the increased pressure  HOW DIET CAN AFFECT YOUR BLADDER  Although there is no particular "diet" that can cure bladder control, there are certain dietary suggestions you can use to help control the problem  There are 2 points to consider when evaluating how your habits and diet may affect your bladder:    Foods and fluids can irritate the bladder  Some foods and beverages are thought to contribute to bladder leakage and irritability  However their effect on the bladder is not completely understood and you may want to see if eliminating one or all of these items improves your bladder control  If you are unable to give them up completely, it is recommended that you use the following items in moderation:  Acidic beverages and foods (orange juice, grapefruit juice, lemonade etc)  Alcoholic beverages  Vinegar  Coffee (regular and decaf)  Tea (regular and decaf)  Caffeinated beverages  Carbonated beverages          Drinking enough and the right kinds of fluids  Many people with bladder control issues decrease their intake of liquids in hope that they will need to urinate less frequently or have less urinary leakage  You should not restrict fluids to control your bladder  While a decrease in liquid intake does result in a decrease in the volume of urine, the smaller amount of urine may be more highly concentrated  Highly concentrated, dark yellow urine is irritating to the bladder surface and may actually cause you to go to the bathroom more frequently        It also encourages the growth of bacteria, which may lead to infections resulting in incontinence  Substitutions for Bladder Irritants: water is always the best beverage choice  Grape and apple juice are thirst quenchers are good selections and are not as irritating to the bladder  Low acid fruits:  Pears, apricots, papaya, watermelon  For coffee drinkers: KAVA®, Postum®, Rodger®, Kaffree Naomi®  For tea drinkers:  non-citrus or herbal and sun brewed tea  Prostate specific antigen measurement   GENERAL INFORMATION:  What is this test?  This test measures the amount of prostate specific antigen (PSA) in blood  This test may be used when benign prostatic hyperplasia (BPH) is suspected  It may also be used to screen for prostate cancer, to help diagnose prostate cancer when it is suspected, and to monitor prostate cancer after treatment  What are other names for this test?  PSA measurement  PSA - Prostate-specific antigen level  PSA - Serum prostate specific antigen level  tPSA measurement - Total prostate specific antigen measurement  What are related tests? Rectal examination  Transrectal biopsy of prostate  Why do I need this test?  Laboratory tests may be done for many reasons  Tests are performed for routine health screenings or if a disease or toxicity is suspected  Lab tests may be used to determine if a medical condition is improving or worsening  Lab tests may also be used to measure the success or failure of a medication or treatment plan  Lab tests may be ordered for professional or legal reasons  You may need this test if you have:   BPH - Benign prostatic hypertrophy  Prostate cancer  When and how often should I have this test?  When and how often laboratory tests are done may depend on many factors  The timing of laboratory tests may rely on the results or completion of other tests, procedures, or treatments  Lab tests may be performed immediately in an emergency, or tests may be delayed as a condition is treated or monitored   A test may be suggested or become necessary when certain signs or symptoms appear  Due to changes in the way your body naturally functions through the course of a day, lab tests may need to be performed at a certain time of day  If you have prepared for a test by changing your food or fluid intake, lab tests may be timed in accordance with those changes  Timing of tests may be based on increased and decreased levels of medications, drugs or other substances in the body  The age or gender of the person being tested may affect when and how often a lab test is required  Chronic or progressive conditions may need ongoing monitoring through the use of lab tests  Conditions that worsen and improve may also need frequent monitoring  Certain tests may be repeated to obtain a series of results, or tests may need to be repeated to confirm or disprove results  Timing and frequency of lab tests may vary if they are performed for professional or legal reasons  How should I get ready for the test?  Before having blood collected, tell the person drawing your blood if you are allergic to latex  Tell the healthcare worker if you have a medical condition or are using a medication or supplement that causes excessive bleeding  Also tell the healthcare worker if you have felt nauseated, lightheaded, or have fainted while having blood drawn in the past   How is the test done? When a blood sample from a vein is needed, a vein in your arm is usually selected  A tourniquet (large rubber strap) may be secured above the vein  The skin over the vein will be cleaned, and a needle will be inserted  You will be asked to hold very still while your blood is collected  Blood will be collected into one or more tubes, and the tourniquet will be removed  When enough blood has been collected, the healthcare worker will take the needle out  How will the test feel?   The amount of discomfort you feel will depend on many factors, including your sensitivity to pain  Communicate how you are feeling with the person doing the test  Inform the person doing the test if you feel that you cannot continue with the test   During a blood draw, you may feel mild discomfort at the location where the blood sample is being collected  What should I do after the test?  After a blood sample is collected from your vein, a bandage, cotton ball, or gauze may be placed on the area where the needle was inserted  You may be asked to apply pressure to the area  Avoid strenuous exercise immediately after your blood draw  Contact your healthcare worker if you feel pain or see redness, swelling, or discharge from the puncture site  What are the risks? Blood: During a blood draw, a hematoma (blood-filled bump under the skin) or slight bleeding from the puncture site may occur  After a blood draw, a bruise or infection may occur at the puncture site  The person doing this test may need to perform it more than once  Talk to your healthcare worker if you have any concerns about the risks of this test   What are normal results for this test?  Laboratory test results may vary depending on your age, gender, health history, the method used for the test, and many other factors  If your results are different from the results suggested below, this may not mean that you have a disease  Contact your healthcare worker if you have any questions  The following are considered to be normal results for this test:  Males, ?36years of age: 0-2 ng/mL (0-2 mcg/L) :  Males, >36years of age: 0-4 ng/mL (0-4 mcg/L)  Females: <0 5 ng/mL (<0 5 mcg/L) : What might affect my test results?   Drug Therapy Use:  Some medications may affect the results of the test  These medications include:  Finasteride (Oral route, Tablet)  Indium In 111 Capromab Pendetide (Intravenous route, Kit)  Ejaculation and digital rectal exam can cause an insignificant rise in PSA levels while prostate biopsy and cystoscopy can cause substantial increases; PSA testing should be delayed until 3 to 4 weeks after these procedures  Exercise, infection, and some medications can also cause a rise in PSA levels  Finasteride will lower PSA by approximately 50%   What follow up should I do after this test?  Ask your healthcare worker how you will be informed of the test results  You may be asked to call for results, schedule an appointment to discuss results, or notified of results by mail  Follow up care varies depending on many factors related to your test  Sometimes there is no follow up after you have been notified of test results  At other times follow up may be suggested or necessary  Some examples of follow up care include changes to medication or treatment plans, referral to a specialist, more or less frequent monitoring, and additional tests or procedures  Talk with your healthcare worker about any concerns or questions you have regarding follow up care or instructions  Where can I get more information? Related Companies   American Urological Association - https://www hollis org/  org  National Kidney and Urologic Diseases Information Clearinghouse - http://kidney  niddk nih gov/    CARE AGREEMENT:   You have the right to help plan your care  To help with this plan, you must learn about your health condition and how it may be treated  You can then discuss treatment options with your caregivers  Work with them to decide what care may be used to treat you  You always have the right to refuse treatment  © Copyright [employer] 2021  Information is for End User's use only and may not be sold, redistributed or otherwise used for commercial purposes  The above information is an  only  It is not intended as a substitute for medical advice for your individual conditions or treatments  Talk to your doctor, nurse or pharmacist before following any medical regimen to see if it is safe and effective for you

## 2022-10-07 ENCOUNTER — IMMUNIZATIONS (OUTPATIENT)
Dept: FAMILY MEDICINE CLINIC | Facility: CLINIC | Age: 72
End: 2022-10-07
Payer: COMMERCIAL

## 2022-10-07 DIAGNOSIS — Z23 NEED FOR INFLUENZA VACCINATION: Primary | ICD-10-CM

## 2022-10-07 PROCEDURE — 90662 IIV NO PRSV INCREASED AG IM: CPT

## 2022-10-07 PROCEDURE — G0008 ADMIN INFLUENZA VIRUS VAC: HCPCS

## 2022-12-01 DIAGNOSIS — E78.00 PURE HYPERCHOLESTEROLEMIA: ICD-10-CM

## 2022-12-01 RX ORDER — ROSUVASTATIN CALCIUM 5 MG/1
TABLET, COATED ORAL
Qty: 90 TABLET | Refills: 1 | Status: SHIPPED | OUTPATIENT
Start: 2022-12-01

## 2023-01-09 ENCOUNTER — APPOINTMENT (OUTPATIENT)
Dept: LAB | Facility: CLINIC | Age: 73
End: 2023-01-09

## 2023-01-09 DIAGNOSIS — E78.00 PURE HYPERCHOLESTEROLEMIA: ICD-10-CM

## 2023-01-09 LAB
ALBUMIN SERPL BCP-MCNC: 3.3 G/DL (ref 3.5–5)
ALP SERPL-CCNC: 96 U/L (ref 46–116)
ALT SERPL W P-5'-P-CCNC: 27 U/L (ref 12–78)
ANION GAP SERPL CALCULATED.3IONS-SCNC: 6 MMOL/L (ref 4–13)
AST SERPL W P-5'-P-CCNC: 21 U/L (ref 5–45)
BILIRUB SERPL-MCNC: 0.62 MG/DL (ref 0.2–1)
BUN SERPL-MCNC: 16 MG/DL (ref 5–25)
CALCIUM ALBUM COR SERPL-MCNC: 9.7 MG/DL (ref 8.3–10.1)
CALCIUM SERPL-MCNC: 9.1 MG/DL (ref 8.3–10.1)
CHLORIDE SERPL-SCNC: 111 MMOL/L (ref 96–108)
CHOLEST SERPL-MCNC: 169 MG/DL
CO2 SERPL-SCNC: 25 MMOL/L (ref 21–32)
CREAT SERPL-MCNC: 1.11 MG/DL (ref 0.6–1.3)
GFR SERPL CREATININE-BSD FRML MDRD: 66 ML/MIN/1.73SQ M
GLUCOSE P FAST SERPL-MCNC: 106 MG/DL (ref 65–99)
HDLC SERPL-MCNC: 63 MG/DL
LDLC SERPL CALC-MCNC: 88 MG/DL (ref 0–100)
NONHDLC SERPL-MCNC: 106 MG/DL
POTASSIUM SERPL-SCNC: 4.8 MMOL/L (ref 3.5–5.3)
PROT SERPL-MCNC: 6.8 G/DL (ref 6.4–8.4)
SODIUM SERPL-SCNC: 142 MMOL/L (ref 135–147)
TRIGL SERPL-MCNC: 88 MG/DL

## 2023-01-16 ENCOUNTER — OFFICE VISIT (OUTPATIENT)
Dept: FAMILY MEDICINE CLINIC | Facility: CLINIC | Age: 73
End: 2023-01-16

## 2023-01-16 VITALS
RESPIRATION RATE: 16 BRPM | SYSTOLIC BLOOD PRESSURE: 125 MMHG | BODY MASS INDEX: 25.21 KG/M2 | TEMPERATURE: 97.6 F | WEIGHT: 160.6 LBS | HEART RATE: 119 BPM | OXYGEN SATURATION: 98 % | HEIGHT: 67 IN | DIASTOLIC BLOOD PRESSURE: 64 MMHG

## 2023-01-16 DIAGNOSIS — R73.01 ELEVATED FASTING BLOOD SUGAR: ICD-10-CM

## 2023-01-16 DIAGNOSIS — I10 PRIMARY HYPERTENSION: Primary | ICD-10-CM

## 2023-01-16 DIAGNOSIS — E78.00 PURE HYPERCHOLESTEROLEMIA: ICD-10-CM

## 2023-01-18 NOTE — PROGRESS NOTES
Blood pressure stable  Assessment/Plan: Labs stable  Recheck to 6 months with repeat labs  Continue current medication    No problem-specific Assessment & Plan notes found for this encounter  Diagnoses and all orders for this visit:    Primary hypertension    Pure hypercholesterolemia  -     Comprehensive metabolic panel; Future  -     Lipid panel; Future  -     TSH, 3rd generation; Future    Elevated fasting blood sugar  -     Hemoglobin A1C; Future        1  Primary hypertension        2  Pure hypercholesterolemia  Comprehensive metabolic panel    Lipid panel    TSH, 3rd generation      3  Elevated fasting blood sugar  Hemoglobin A1C          Subjective:        Patient ID: Lorena Araiza is a 81 y o  male  Chief Complaint   Patient presents with   • Follow-up       Blood pressure check review of labs  Doing well  Going to [place] tomorrow for a 10-day ski trip      The following portions of the patient's history were reviewed and updated as appropriate: past medical history, past surgical history and problem list       Review of Systems   Constitutional: Negative for fatigue  Eyes: Negative for visual disturbance  Respiratory: Negative for cough and shortness of breath  Cardiovascular: Negative for chest pain, palpitations and leg swelling  Gastrointestinal: Negative for abdominal pain  Neurological: Negative for dizziness and headaches  Psychiatric/Behavioral: The patient is not nervous/anxious  Objective:  /64 (BP Location: Right arm, Patient Position: Sitting, Cuff Size: Adult)   Pulse (!) 119   Temp 97 6 °F (36 4 °C) (Temporal)   Resp 16   Ht 5' 7" (1 702 m)   Wt 72 8 kg (160 lb 9 6 oz)   SpO2 98%   BMI 25 15 kg/m²        Physical Exam  Vitals and nursing note reviewed  Constitutional:       General: He is not in acute distress  Appearance: Normal appearance  He is not ill-appearing  HENT:      Head: Normocephalic  Eyes:      General: No scleral icterus  Pupils: Pupils are equal, round, and reactive to light  Cardiovascular:      Rate and Rhythm: Normal rate and regular rhythm  Heart sounds: Normal heart sounds  No murmur heard  Pulmonary:      Breath sounds: Normal breath sounds  Musculoskeletal:      Right lower leg: No edema  Left lower leg: No edema  Lymphadenopathy:      Cervical: No cervical adenopathy  Skin:     Coloration: Skin is not pale  Neurological:      General: No focal deficit present  Mental Status: He is alert and oriented to person, place, and time  Psychiatric:         Behavior: Behavior normal          Thought Content:  Thought content normal

## 2023-03-02 DIAGNOSIS — I10 ESSENTIAL HYPERTENSION: ICD-10-CM

## 2023-03-02 RX ORDER — LOSARTAN POTASSIUM 50 MG/1
TABLET ORAL
Qty: 135 TABLET | Refills: 1 | Status: SHIPPED | OUTPATIENT
Start: 2023-03-02

## 2023-03-02 NOTE — TELEPHONE ENCOUNTER
Pt called stating his BP has been a little high since last week, pt has been taking 75 mg of losartan

## 2023-03-07 ENCOUNTER — APPOINTMENT (OUTPATIENT)
Dept: LAB | Facility: CLINIC | Age: 73
End: 2023-03-07

## 2023-03-07 DIAGNOSIS — R97.20 ELEVATED PSA: ICD-10-CM

## 2023-03-07 LAB — PSA SERPL-MCNC: 3.9 NG/ML (ref 0–4)

## 2023-03-14 ENCOUNTER — OFFICE VISIT (OUTPATIENT)
Dept: UROLOGY | Facility: CLINIC | Age: 73
End: 2023-03-14

## 2023-03-14 VITALS
WEIGHT: 162 LBS | BODY MASS INDEX: 25.43 KG/M2 | OXYGEN SATURATION: 84 % | SYSTOLIC BLOOD PRESSURE: 142 MMHG | HEIGHT: 67 IN | HEART RATE: 114 BPM | DIASTOLIC BLOOD PRESSURE: 82 MMHG

## 2023-03-14 DIAGNOSIS — R97.20 ELEVATED PSA: Primary | ICD-10-CM

## 2023-03-14 NOTE — PATIENT INSTRUCTIONS
Prostate specific antigen measurement   GENERAL INFORMATION:  What is this test?  This test measures the amount of prostate specific antigen (PSA) in blood  This test may be used when benign prostatic hyperplasia (BPH) is suspected  It may also be used to screen for prostate cancer, to help diagnose prostate cancer when it is suspected, and to monitor prostate cancer after treatment  What are other names for this test?  PSA measurement  PSA - Prostate-specific antigen level  PSA - Serum prostate specific antigen level  tPSA measurement - Total prostate specific antigen measurement  What are related tests? Rectal examination  Transrectal needle biopsy of prostate  Why do I need this test?  Laboratory tests may be done for many reasons  Tests are performed for routine health screenings or if a disease or toxicity is suspected  Lab tests may be used to determine if a medical condition is improving or worsening  Lab tests may also be used to measure the success or failure of a medication or treatment plan  Lab tests may be ordered for professional or legal reasons  You may need this test if you have:   BPH - Benign prostatic hypertrophy  Prostate cancer  When and how often should I have this test?  When and how often laboratory tests are done may depend on many factors  The timing of laboratory tests may rely on the results or completion of other tests, procedures, or treatments  Lab tests may be performed immediately in an emergency, or tests may be delayed as a condition is treated or monitored  A test may be suggested or become necessary when certain signs or symptoms appear  Due to changes in the way your body naturally functions through the course of a day, lab tests may need to be performed at a certain time of day  If you have prepared for a test by changing your food or fluid intake, lab tests may be timed in accordance with those changes   Timing of tests may be based on increased and decreased levels of medications, drugs or other substances in the body  The age or gender of the person being tested may affect when and how often a lab test is required  Chronic or progressive conditions may need ongoing monitoring through the use of lab tests  Conditions that worsen and improve may also need frequent monitoring  Certain tests may be repeated to obtain a series of results, or tests may need to be repeated to confirm or disprove results  Timing and frequency of lab tests may vary if they are performed for professional or legal reasons  How should I get ready for the test?  Before having blood collected, tell the person drawing your blood if you are allergic to latex  Tell the healthcare worker if you have a medical condition or are using a medication or supplement that causes excessive bleeding  Also tell the healthcare worker if you have felt nauseated, lightheaded, or have fainted while having blood drawn in the past   How is the test done? When a blood sample from a vein is needed, a vein in your arm is usually selected  A tourniquet (large rubber strap) may be secured above the vein  The skin over the vein will be cleaned, and a needle will be inserted  You will be asked to hold very still while your blood is collected  Blood will be collected into one or more tubes, and the tourniquet will be removed  When enough blood has been collected, the healthcare worker will take the needle out  How will the test feel? The amount of discomfort you feel will depend on many factors, including your sensitivity to pain  Communicate how you are feeling with the person doing the test  Inform the person doing the test if you feel that you cannot continue with the test   During a blood draw, you may feel mild discomfort at the location where the blood sample is being collected    What should I do after the test?  After a blood sample is collected from your vein, a bandage, cotton ball, or gauze may be placed on the area where the needle was inserted  You may be asked to apply pressure to the area  Avoid strenuous exercise immediately after your blood draw  Contact your healthcare worker if you feel pain or see redness, swelling, or discharge from the puncture site  What are the risks? Blood: During a blood draw, a hematoma (blood-filled bump under the skin) or slight bleeding from the puncture site may occur  After a blood draw, a bruise or infection may occur at the puncture site  The person doing this test may need to perform it more than once  Talk to your healthcare worker if you have any concerns about the risks of this test   What are normal results for this test?  Laboratory test results may vary depending on your age, gender, health history, the method used for the test, and many other factors  If your results are different from the results suggested below, this may not mean that you have a disease  Contact your healthcare worker if you have any questions  The following are considered to be normal results for this test:  Males, ?36years of age: 0-2 ng/mL (0-2 mcg/L) :  Males, >36years of age: 0-4 ng/mL (0-4 mcg/L)  Females: <0 5 ng/mL (<0 5 mcg/L) : What might affect my test results? Drug Therapy Use:  Some medications may affect the results of the test  These medications include:  Finasteride (Oral route, Tablet)  Indium In 111 Capromab Pendetide (Intravenous route, Kit)  Ejaculation and digital rectal exam can cause an insignificant rise in PSA levels while prostate biopsy and cystoscopy can cause substantial increases; PSA testing should be delayed until 3 to 4 weeks after these procedures  Exercise, infection, and some medications can also cause a rise in PSA levels  Finasteride will lower PSA by approximately 50%   What follow up should I do after this test?  Ask your healthcare worker how you will be informed of the test results   You may be asked to call for results, schedule an appointment to discuss results, or notified of results by mail  Follow up care varies depending on many factors related to your test  Sometimes there is no follow up after you have been notified of test results  At other times follow up may be suggested or necessary  Some examples of follow up care include changes to medication or treatment plans, referral to a specialist, more or less frequent monitoring, and additional tests or procedures  Talk with your healthcare worker about any concerns or questions you have regarding follow up care or instructions  Where can I get more information? Related Companies   American Urological Association - https://www hollis org/  org  National Kidney and Urologic Diseases Information Clearinghouse - http://kidney  niddk nih gov/    CARE AGREEMENT:   You have the right to help plan your care  To help with this plan, you must learn about your health condition and how it may be treated  You can then discuss treatment options with your caregivers  Work with them to decide what care may be used to treat you  You always have the right to refuse treatment  © Copyright [employer] 2022  Information is for End User's use only and may not be sold, redistributed or otherwise used for commercial purposes  The above information is an  only  It is not intended as a substitute for medical advice for your individual conditions or treatments  Talk to your doctor, nurse or pharmacist before following any medical regimen to see if it is safe and effective for you

## 2023-03-14 NOTE — PROGRESS NOTES
Assessment and plan:     Elevated PSA  • PSA 3 9 ( was 2 7 last year)  • Recheck psa in 6 months  • If psa continues to rise recommend prostate biopsy +/- MRI of prostate  • Follow up 6 months for KUNAL      LUDY Logan    History of Present Illness     Ansley Arevalo is a 81 y o  male presents for 6-month follow-up of elevated PSA  He denies family history of prostate cancer  His PSA had been 2 3-2 7 through 2021  He had repeat testing in 2022 at which point increase it to 3 7  Component PSA, Total   Latest Ref Rng & Units 0 0 - 4 0 ng/mL   6/4/2018 2 3   6/10/2019 2 7   6/12/2020 2 4   6/29/2021 2 7   7/7/2022 3 7   8/22/2022 3 7   3/7/2023 3 9     Denies any urinary symptoms  Denies any episodes of gross hematuria  No history of kidney stones  Laboratory     Lab Results   Component Value Date    BUN 16 01/09/2023    CREATININE 1 11 01/09/2023       No components found for: GFR    Lab Results   Component Value Date    GLUCOSE 99 11/10/2015    CALCIUM 9 1 01/09/2023     11/10/2015    K 4 8 01/09/2023    CO2 25 01/09/2023     (H) 01/09/2023       Lab Results   Component Value Date    WBC 7 75 05/11/2017    HGB 14 7 05/11/2017    HCT 44 3 05/11/2017    MCV 94 05/11/2017     05/11/2017       Lab Results   Component Value Date    PSA 3 9 03/07/2023    PSA 3 7 08/22/2022    PSA 3 7 07/07/2022       No results found for this or any previous visit (from the past 1 hour(s))  @RESULT(URINEMICROSCOPIC)@    @RESULT(URINECULTURE)@    Radiology       Review of Systems     Review of Systems   Constitutional: Negative for activity change, appetite change, chills, fatigue, fever and unexpected weight change  HENT: Negative for facial swelling  Eyes: Negative for discharge  Respiratory: Negative  Negative for cough and shortness of breath  Cardiovascular: Negative for chest pain and leg swelling  Gastrointestinal: Negative    Negative for abdominal distention, abdominal pain, constipation, diarrhea, nausea and vomiting  Endocrine: Negative  Genitourinary: Negative  Negative for decreased urine volume, difficulty urinating, dysuria, enuresis, flank pain, frequency, genital sores, hematuria and urgency  Musculoskeletal: Negative for back pain and myalgias  Skin: Negative for pallor and rash  Allergic/Immunologic: Negative  Negative for immunocompromised state  Neurological: Negative for facial asymmetry and speech difficulty  Psychiatric/Behavioral: Negative for agitation and confusion  AUA SYMPTOM SCORE    Flowsheet Row Most Recent Value   AUA SYMPTOM SCORE    How often have you had a sensation of not emptying your bladder completely after you finished urinating? 1 (P)     How often have you had to urinate again less than two hours after you finished urinating? 1 (P)     How often have you found you stopped and started again several times when you urinate? 0 (P)     How often have you found it difficult to postpone urination? 0 (P)     How often have you had a weak urinary stream? 2 (P)     How often have you had to push or strain to begin urination? 0 (P)     How many times did you most typically get up to urinate from the time you went to bed at night until the time you got up in the morning? 0 (P)     Quality of Life: If you were to spend the rest of your life with your urinary condition just the way it is now, how would you feel about that? 2 (P)     AUA SYMPTOM SCORE 4 (P)                 Allergies     Allergies   Allergen Reactions   • Ezetimibe-Simvastatin      Annotation - [id number]: myalgia   • Other      Pentothal KIT       Physical Exam     Physical Exam  Constitutional:       General: He is not in acute distress  Appearance: Normal appearance  He is not ill-appearing  HENT:      Head: Normocephalic and atraumatic  Eyes:      General: No scleral icterus  Cardiovascular:      Rate and Rhythm: Normal rate     Pulmonary:      Effort: Pulmonary effort is normal  No respiratory distress  Abdominal:      General: Abdomen is flat  There is no distension  Palpations: Abdomen is soft  Musculoskeletal:         General: No swelling  Skin:     General: Skin is warm and dry  Coloration: Skin is not jaundiced or pale  Findings: No rash  Neurological:      General: No focal deficit present  Mental Status: He is alert and oriented to person, place, and time  Gait: Gait normal    Psychiatric:         Mood and Affect: Mood normal          Behavior: Behavior normal          Thought Content:  Thought content normal          Vital Signs     Vitals:    03/14/23 1320   BP: 142/82   BP Location: Left arm   Patient Position: Sitting   Cuff Size: Adult   Pulse: (!) 114   SpO2: (!) 84%   Weight: 73 5 kg (162 lb)   Height: 5' 7" (1 702 m)       Current Medications       Current Outpatient Medications:   •  aspirin 81 MG tablet, Take 81 mg by mouth daily, Disp: , Rfl:   •  Cholecalciferol (VITAMIN D3) 400 units CAPS, Take 400 Units by mouth , Disp: , Rfl:   •  losartan (COZAAR) 50 mg tablet, Take 1 and a half tabs daily to equal 75 mg daily, Disp: 135 tablet, Rfl: 1  •  LUTEIN-ZEAXANTHIN PO, Take by mouth 20mg of Lutein and 1mg of Zeaxanthin, Disp: , Rfl:   •  Multiple Vitamins-Minerals (MULTIVITAMIN WITH MINERALS) tablet, Take 1 tablet by mouth , Disp: , Rfl:   •  rosuvastatin (CRESTOR) 5 mg tablet, TAKE 1 TABLET BY MOUTH EVERY DAY, Disp: 90 tablet, Rfl: 1    Active Problems     Patient Active Problem List   Diagnosis   • Benign hypertensive CKD, 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 chronic kidney disease   • Chronic kidney disease, stage 2 (mild)   • Hyperlipidemia   • Vitamin D deficiency   • Elevated PSA       Past Medical History     Past Medical History:   Diagnosis Date   • Acute pain of right shoulder     [id number] RESOLVED   • Anemia     [id number] RESOLVED   • Colon polyp    • Hyperlipemia    • Hypertension        Surgical History     Past Surgical History:   Procedure Laterality Date   • BASAL CELL CARCINOMA EXCISION  11/03/2018    on back   • CATARACT EXTRACTION Right    • CATARACT EXTRACTION Left 08/24/2021   • COLONOSCOPY     • EYE SURGERY      right    • [place] COLONOSCOPY FLX DX W/COLLJ SPEC WHEN PFRMD N/A 8/17/2016     COLONOSCOPY;  Surgeon: Abi Clarke 2016 - 2019 (Rosemary Bending) - 2022       Family History     Family History   Problem Relation Age of Onset   • Other Mother         ATHEROSCLEROSIS   • Kidney disease Mother         CHRONIC    • Hypertension Mother         ESSENTIAL   • Glaucoma Mother    • Macular degeneration Mother    • Glaucoma Father    • Multiple sclerosis Sister        Social History     Social History     Social History     Tobacco Use   Smoking Status Never   Smokeless Tobacco Never       Past Surgical History:   Procedure Laterality Date   • BASAL CELL CARCINOMA EXCISION  11/03/2018    on back   • CATARACT EXTRACTION Right    • CATARACT EXTRACTION Left 08/24/2021   • COLONOSCOPY     • EYE SURGERY      right    • OH COLONOSCOPY FLX DX W/COLLJ SPEC WHEN PFRMD N/A 8/17/2016     COLONOSCOPY;  Surgeon: Abi Clarke 2016 - 2019 Abbi Button) - 2022         The following portions of the patient's history were reviewed and updated as appropriate: allergies, current medications, past family history, past medical history, past social history, past surgical history and problem list    Please note :  Voice dictation software has been used to create this document  There may be inadvertent transcription errors      [id number] [id number] [place] [address]

## 2023-03-14 NOTE — ASSESSMENT & PLAN NOTE
• PSA 3 9 ( was 2 7 last year)  • Recheck psa in 6 months  • If psa continues to rise recommend prostate biopsy +/- MRI of prostate  • Follow up 6 months for KUNAL

## 2023-07-11 ENCOUNTER — APPOINTMENT (OUTPATIENT)
Dept: LAB | Facility: CLINIC | Age: 73
End: 2023-07-11
Payer: COMMERCIAL

## 2023-07-11 DIAGNOSIS — E78.00 PURE HYPERCHOLESTEROLEMIA: ICD-10-CM

## 2023-07-11 DIAGNOSIS — R73.01 ELEVATED FASTING BLOOD SUGAR: ICD-10-CM

## 2023-07-11 LAB
ALBUMIN SERPL BCP-MCNC: 3.7 G/DL (ref 3.5–5)
ALP SERPL-CCNC: 94 U/L (ref 46–116)
ALT SERPL W P-5'-P-CCNC: 24 U/L (ref 12–78)
ANION GAP SERPL CALCULATED.3IONS-SCNC: 7 MMOL/L
AST SERPL W P-5'-P-CCNC: 22 U/L (ref 5–45)
BILIRUB SERPL-MCNC: 0.69 MG/DL (ref 0.2–1)
BUN SERPL-MCNC: 19 MG/DL (ref 5–25)
CALCIUM SERPL-MCNC: 9.8 MG/DL (ref 8.3–10.1)
CHLORIDE SERPL-SCNC: 113 MMOL/L (ref 96–108)
CHOLEST SERPL-MCNC: 162 MG/DL
CO2 SERPL-SCNC: 23 MMOL/L (ref 21–32)
CREAT SERPL-MCNC: 1.18 MG/DL (ref 0.6–1.3)
GFR SERPL CREATININE-BSD FRML MDRD: 61 ML/MIN/1.73SQ M
GLUCOSE P FAST SERPL-MCNC: 87 MG/DL (ref 65–99)
HDLC SERPL-MCNC: 59 MG/DL
LDLC SERPL CALC-MCNC: 83 MG/DL (ref 0–100)
NONHDLC SERPL-MCNC: 103 MG/DL
POTASSIUM SERPL-SCNC: 5.1 MMOL/L (ref 3.5–5.3)
PROT SERPL-MCNC: 7 G/DL (ref 6.4–8.4)
SODIUM SERPL-SCNC: 143 MMOL/L (ref 135–147)
TRIGL SERPL-MCNC: 99 MG/DL
TSH SERPL DL<=0.05 MIU/L-ACNC: 1.08 UIU/ML (ref 0.45–4.5)

## 2023-07-11 PROCEDURE — 83036 HEMOGLOBIN GLYCOSYLATED A1C: CPT

## 2023-07-11 PROCEDURE — 84443 ASSAY THYROID STIM HORMONE: CPT

## 2023-07-11 PROCEDURE — 80053 COMPREHEN METABOLIC PANEL: CPT

## 2023-07-11 PROCEDURE — 36415 COLL VENOUS BLD VENIPUNCTURE: CPT

## 2023-07-11 PROCEDURE — 80061 LIPID PANEL: CPT

## 2023-07-12 LAB
EST. AVERAGE GLUCOSE BLD GHB EST-MCNC: 105 MG/DL
HBA1C MFR BLD: 5.3 %

## 2023-07-19 ENCOUNTER — OFFICE VISIT (OUTPATIENT)
Dept: FAMILY MEDICINE CLINIC | Facility: CLINIC | Age: 73
End: 2023-07-19
Payer: COMMERCIAL

## 2023-07-19 VITALS
OXYGEN SATURATION: 98 % | DIASTOLIC BLOOD PRESSURE: 78 MMHG | SYSTOLIC BLOOD PRESSURE: 188 MMHG | WEIGHT: 158 LBS | BODY MASS INDEX: 24.8 KG/M2 | HEIGHT: 67 IN | HEART RATE: 102 BPM

## 2023-07-19 DIAGNOSIS — R10.9 ABDOMINAL DISCOMFORT: ICD-10-CM

## 2023-07-19 DIAGNOSIS — Z00.00 MEDICARE ANNUAL WELLNESS VISIT, SUBSEQUENT: Primary | ICD-10-CM

## 2023-07-19 DIAGNOSIS — I10 PRIMARY HYPERTENSION: ICD-10-CM

## 2023-07-19 DIAGNOSIS — Z12.11 COLON CANCER SCREENING: ICD-10-CM

## 2023-07-19 DIAGNOSIS — E78.00 PURE HYPERCHOLESTEROLEMIA: ICD-10-CM

## 2023-07-19 PROBLEM — N18.2 CHRONIC KIDNEY DISEASE, STAGE 2 (MILD): Status: RESOLVED | Noted: 2017-12-11 | Resolved: 2023-07-19

## 2023-07-19 PROCEDURE — 99214 OFFICE O/P EST MOD 30 MIN: CPT | Performed by: FAMILY MEDICINE

## 2023-07-19 PROCEDURE — G0439 PPPS, SUBSEQ VISIT: HCPCS | Performed by: FAMILY MEDICINE

## 2023-07-19 NOTE — PATIENT INSTRUCTIONS
Medicare Preventive Visit Patient Instructions  Thank you for completing your Welcome to Medicare Visit or Medicare Annual Wellness Visit today. Your next wellness visit will be due in one year (7/19/2024). The screening/preventive services that you may require over the next 5-10 years are detailed below. Some tests may not apply to you based off risk factors and/or age. Screening tests ordered at today's visit but not completed yet may show as past due. Also, please note that scanned in results may not display below. Preventive Screenings:  Service Recommendations Previous Testing/Comments   Colorectal Cancer Screening  · Colonoscopy    · Fecal Occult Blood Test (FOBT)/Fecal Immunochemical Test (FIT)  · Fecal DNA/Cologuard Test  · Flexible Sigmoidoscopy Age: 43-73 years old   Colonoscopy: every 10 years (May be performed more frequently if at higher risk)  OR  FOBT/FIT: every 1 year  OR  Cologuard: every 3 years  OR  Sigmoidoscopy: every 5 years  Screening may be recommended earlier than age 39 if at higher risk for colorectal cancer. Also, an individualized decision between you and your healthcare provider will decide whether screening between the ages of 77-80 would be appropriate.  Colonoscopy: 01/29/2020  FOBT/FIT: Not on file  Cologuard: Not on file  Sigmoidoscopy: Not on file    Screening Current     Prostate Cancer Screening Individualized decision between patient and health care provider in men between ages of 53-66   Medicare will cover every 12 months beginning on the day after your 50th birthday PSA: 3.9 ng/mL     Screening Current     Hepatitis C Screening Once for adults born between 1945 and 1965  More frequently in patients at high risk for Hepatitis C Hep C Antibody: Not on file        Diabetes Screening 1-2 times per year if you're at risk for diabetes or have pre-diabetes Fasting glucose: 87 mg/dL (7/11/2023)  A1C: 5.3 % (7/11/2023)  Screening Current   Cholesterol Screening Once every 5 years if you don't have a lipid disorder. May order more often based on risk factors. Lipid panel: 07/11/2023  Screening Not Indicated  History Lipid Disorder      Other Preventive Screenings Covered by Medicare:  1. Abdominal Aortic Aneurysm (AAA) Screening: covered once if your at risk. You're considered to be at risk if you have a family history of AAA or a male between the age of 70-76 who smoking at least 100 cigarettes in your lifetime. 2. Lung Cancer Screening: covers low dose CT scan once per year if you meet all of the following conditions: (1) Age 48-67; (2) No signs or symptoms of lung cancer; (3) Current smoker or have quit smoking within the last 15 years; (4) You have a tobacco smoking history of at least 20 pack years (packs per day x number of years you smoked); (5) You get a written order from a healthcare provider. 3. Glaucoma Screening: covered annually if you're considered high risk: (1) You have diabetes OR (2) Family history of glaucoma OR (3)  aged 48 and older OR (3)  American aged 72 and older  3. Osteoporosis Screening: covered every 2 years if you meet one of the following conditions: (1) Have a vertebral abnormality; (2) On glucocorticoid therapy for more than 3 months; (3) Have primary hyperparathyroidism; (4) On osteoporosis medications and need to assess response to drug therapy. 5. HIV Screening: covered annually if you're between the age of 14-79. Also covered annually if you are younger than 13 and older than 72 with risk factors for HIV infection. For pregnant patients, it is covered up to 3 times per pregnancy.     Immunizations:  Immunization Recommendations   Influenza Vaccine Annual influenza vaccination during flu season is recommended for all persons aged >= 6 months who do not have contraindications   Pneumococcal Vaccine   * Pneumococcal conjugate vaccine = PCV13 (Prevnar 13), PCV15 (Vaxneuvance), PCV20 (Prevnar 20)  * Pneumococcal polysaccharide vaccine = PPSV23 (Pneumovax) Adults 2364 years old: 1-3 doses may be recommended based on certain risk factors  Adults 72 years old: 1-2 doses may be recommended based off what pneumonia vaccine you previously received   Hepatitis B Vaccine 3 dose series if at intermediate or high risk (ex: diabetes, end stage renal disease, liver disease)   Tetanus (Td) Vaccine - COST NOT COVERED BY MEDICARE PART B Following completion of primary series, a booster dose should be given every 10 years to maintain immunity against tetanus. Td may also be given as tetanus wound prophylaxis. Tdap Vaccine - COST NOT COVERED BY MEDICARE PART B Recommended at least once for all adults. For pregnant patients, recommended with each pregnancy. Shingles Vaccine (Shingrix) - COST NOT COVERED BY MEDICARE PART B  2 shot series recommended in those aged 48 and above     Health Maintenance Due:      Topic Date Due   • Hepatitis C Screening  Never done   • Colorectal Cancer Screening  01/29/2023     Immunizations Due:      Topic Date Due   • COVID-19 Vaccine (4 - Pfizer series) 01/14/2022   • Influenza Vaccine (1) 09/01/2023     Advance Directives   What are advance directives? Advance directives are legal documents that state your wishes and plans for medical care. These plans are made ahead of time in case you lose your ability to make decisions for yourself. Advance directives can apply to any medical decision, such as the treatments you want, and if you want to donate organs. What are the types of advance directives? There are many types of advance directives, and each state has rules about how to use them. You may choose a combination of any of the following:  · Living will: This is a written record of the treatment you want. You can also choose which treatments you do not want, which to limit, and which to stop at a certain time. This includes surgery, medicine, IV fluid, and tube feedings.    · Durable power of  for healthcare [facility]): This is a written record that states who you want to make healthcare choices for you when you are unable to make them for yourself. This person, called a proxy, is usually a family member or a friend. You may choose more than 1 proxy. · Do not resuscitate (DNR) order:  A DNR order is used in case your heart stops beating or you stop breathing. It is a request not to have certain forms of treatment, such as CPR. A DNR order may be included in other types of advance directives. · Medical directive: This covers the care that you want if you are in a coma, near death, or unable to make decisions for yourself. You can list the treatments you want for each condition. Treatment may include pain medicine, surgery, blood transfusions, dialysis, IV or tube feedings, and a ventilator (breathing machine). · Values history: This document has questions about your views, beliefs, and how you feel and think about life. This information can help others choose the care that you would choose. Why are advance directives important? An advance directive helps you control your care. Although spoken wishes may be used, it is better to have your wishes written down. Spoken wishes can be misunderstood, or not followed. Treatments may be given even if you do not want them. An advance directive may make it easier for your family to make difficult choices about your care. © Copyright [employer] 2018 Information is for End User's use only and may not be sold, redistributed or otherwise used for commercial purposes.  All illustrations and images included in CareNotes® are the copyrighted property of [employer] or [employer]

## 2023-07-19 NOTE — PROGRESS NOTES
Assessment and Plan:   Health maintenance completed. Patient has living will power of . Blood pressure elevated today. Home blood pressures are more so in the 509H systolic. Recommend flu shot in the fall. Recent TSH is normal.  CMP stable. A1c 5.3. Total cholesterol 162. HDL 59. LDL 83. Discussed getting CT coronary calcium score. Declines. Recheck 6 months with repeat labs. Problem List Items Addressed This Visit    None  Visit Diagnoses     Need for hepatitis C screening test    -  Primary          Depression Screening and Follow-up Plan: Patient was screened for depression during today's encounter. They screened negative with a PHQ-2 score of 0. Preventive health issues were discussed with patient, and age appropriate screening tests were ordered as noted in patient's After Visit Summary. Personalized health advice and appropriate referrals for health education or preventive services given if needed, as noted in patient's After Visit Summary. History of Present Illness:     Patient presents for a Medicare Wellness Visit    Care wellness. Overall well except notices occasional questionable hernia right lower quadrant. No severe pain. No change in bowel movements. Patient Care Team:  Yaquelin Mendoza DO as PCP - General  Benson Toth MD as Endoscopist     Review of Systems:     Review of Systems   Constitutional: Negative for appetite change, fatigue, fever and unexpected weight change. HENT: Negative for congestion, ear pain, postnasal drip, rhinorrhea, sinus pressure, sinus pain and sore throat. Eyes: Negative for redness and visual disturbance. Respiratory: Negative for chest tightness and shortness of breath. Cardiovascular: Negative for chest pain, palpitations and leg swelling. Gastrointestinal: Negative for abdominal distention, diarrhea and nausea. Endocrine: Negative for cold intolerance and heat intolerance.    Genitourinary: Negative for dysuria and hematuria. Musculoskeletal: Negative for arthralgias, gait problem and myalgias. Skin: Negative for pallor and rash. Neurological: Negative for dizziness, tremors, weakness, light-headedness and headaches. Psychiatric/Behavioral: Negative for behavioral problems. The patient is not nervous/anxious.          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 chronic kidney disease   • Chronic kidney disease, stage 2 (mild)   • Hyperlipidemia   • Vitamin D deficiency   • Elevated PSA      Past Medical and Surgical History:     Past Medical History:   Diagnosis Date   • Acute pain of right shoulder     [id number] RESOLVED   • Anemia     [id number] RESOLVED   • Colon polyp    • Hyperlipemia    • Hypertension      Past Surgical History:   Procedure Laterality Date   • BASAL CELL CARCINOMA EXCISION  11/03/2018    on back   • CATARACT EXTRACTION Right    • CATARACT EXTRACTION Left 08/24/2021   • COLONOSCOPY     • EYE SURGERY      right    • [place] COLONOSCOPY FLX DX W/COLLJ SPEC WHEN PFRMD N/A 8/17/2016     COLONOSCOPY;  Surgeon: Celia Arvizu 2016 - 2019 Alejandro Mccabe) - 2022      Family History:     Family History   Problem Relation Age of Onset   • Other Mother         ATHEROSCLEROSIS   • Kidney disease Mother         CHRONIC    • Hypertension Mother         ESSENTIAL   • Glaucoma Mother    • Macular degeneration Mother    • Glaucoma Father    • Multiple sclerosis Sister       Social History:     Social History     Socioeconomic History   • Marital status: Single     Spouse name: None   • Number of children: None   • Years of education: None   • Highest education level: None   Occupational History   • None   Tobacco Use   • Smoking status: Never   • Smokeless tobacco: Never   Vaping Use   • Vaping Use: Never used   Substance and Sexual Activity   • Alcohol use: Yes     Comment: social   • Drug use: Not Currently   • Sexual activity: None   Other Topics Concern   • None   Social History Narrative   • None     Social Determinants of Health     Financial Resource Strain: Low Risk  (7/18/2023)    Overall Financial Resource Strain (CARDIA)    • Difficulty of Paying Living Expenses: Not hard at all   Food Insecurity: Not on file   Transportation Needs: No Transportation Needs (7/18/2023)    PRAPARE - Transportation    • Lack of Transportation (Medical): No    • Lack of Transportation (Non-Medical): No   Physical Activity: Not on file   Stress: Not on file   Social Connections: Not on file   Intimate Partner Violence: Not on file   Housing Stability: Not on file      Medications and Allergies:     Current Outpatient Medications   Medication Sig Dispense Refill   • aspirin 81 MG tablet Take 81 mg by mouth daily     • Cholecalciferol (VITAMIN D3) 400 units CAPS Take 1,000 Units by mouth     • losartan (COZAAR) 50 mg tablet Take 1 and a half tabs daily to equal 75 mg daily 135 tablet 1   • LUTEIN-ZEAXANTHIN PO Take by mouth 20mg of Lutein and 1mg of Zeaxanthin     • Multiple Vitamins-Minerals (MULTIVITAMIN WITH MINERALS) tablet Take 1 tablet by mouth. • rosuvastatin (CRESTOR) 5 mg tablet TAKE 1 TABLET BY MOUTH EVERY DAY 90 tablet 1     No current facility-administered medications for this visit.      Allergies   Allergen Reactions   • Ezetimibe-Simvastatin      Annotation - [id number]: myalgia   • Other      Pentothal KIT      Immunizations:     Immunization History   Administered Date(s) Administered   • COVID-19 PFIZER VACCINE 0.3 ML IM 03/19/2021, 04/13/2021, 11/19/2021   • INFLUENZA 10/07/2022   • Influenza Quadrivalent, 6-35 Months IM 11/17/2015   • Influenza Split High Dose Preservative Free IM 10/10/2017   • Influenza, high dose seasonal 0.7 mL 10/02/2018, 10/09/2019, 10/30/2020, 10/13/2021, 10/07/2022   • Influenza, seasonal, injectable 09/26/2012, 10/02/2014   • Pneumococcal Conjugate 13-Valent 05/16/2016   • Pneumococcal Polysaccharide PPV23 12/11/2017   • Td (adult), adsorbed 05/25/2015   • Tdap 05/05/2010   • Zoster 04/30/2015      Health Maintenance:         Topic Date Due   • Hepatitis C Screening  Never done   • Colorectal Cancer Screening  01/29/2023         Topic Date Due   • COVID-19 Vaccine (4 - Pfizer series) 01/14/2022   • Influenza Vaccine (1) 09/01/2023      Medicare Screening Tests and Risk Assessments:     Femi Ledezma is here for his Subsequent Wellness visit. Last Medicare Wellness visit information reviewed, patient interviewed, no change since last AWV. Health Risk Assessment:   Patient rates overall health as very good. Patient feels that their physical health rating is same. Patient is satisfied with their life. Eyesight was rated as same. Hearing was rated as same. Patient feels that their emotional and mental health rating is same. Patients states they are never, rarely angry. Patient states they are sometimes unusually tired/fatigued. Pain experienced in the last 7 days has been none. Patient states that he has experienced no weight loss or gain in last 6 months. Depression Screening:   PHQ-2 Score: 0      Fall Risk Screening: In the past year, patient has experienced: no history of falling in past year      Home Safety:  Patient does not have trouble with stairs inside or outside of their home. Patient has working smoke alarms and has working carbon monoxide detector. Home safety hazards include: none. Nutrition:   Current diet is Regular. Medications:   Patient is currently taking over-the-counter supplements. OTC medications include: MUTI VIT. VIT D3. Patient is able to manage medications. Activities of Daily Living (ADLs)/Instrumental Activities of Daily Living (IADLs):   Walk and transfer into and out of bed and chair?: Yes  Dress and groom yourself?: Yes    Bathe or shower yourself?: Yes    Feed yourself?  Yes  Do your laundry/housekeeping?: Yes  Manage your money, pay your bills and track your expenses?: Yes  Make your own meals?: Yes Do your own shopping?: Yes    Previous Hospitalizations:   Any hospitalizations or ED visits within the last 12 months?: No      Advance Care Planning:   Living will: Yes    Durable POA for healthcare: Yes    Advanced directive: Yes      Cognitive Screening:   Provider or family/friend/caregiver concerned regarding cognition?: No    PREVENTIVE SCREENINGS      Cardiovascular Screening:    General: Screening Not Indicated and History Lipid Disorder      Diabetes Screening:     General: Screening Current      Colorectal Cancer Screening:     General: Screening Current      Prostate Cancer Screening:    General: Screening Current      Osteoporosis Screening:    General: Screening Not Indicated      Abdominal Aortic Aneurysm (AAA) Screening:    Risk factors include: age between 70-77 yo        General: Patient Declines      Lung Cancer Screening:     General: Screening Not Indicated      Hepatitis C Screening:    General: Patient Declines    Screening, Brief Intervention, and Referral to Treatment (SBIRT)    Screening  Typical number of drinks in a day: 0  Typical number of drinks in a week: 4  Interpretation: Low risk drinking behavior. AUDIT-C Screenin) How often did you have a drink containing alcohol in the past year? 2 to 3 times a week  2) How many drinks did you have on a typical day when you were drinking in the past year? 1 to 2  3) How often did you have 6 or more drinks on one occasion in the past year? never    AUDIT-C Score: 3  Interpretation: Score 0-3 (male): Negative screen for alcohol misuse    Single Item Drug Screening:  How often have you used an illegal drug (including marijuana) or a prescription medication for non-medical reasons in the past year? never    Single Item Drug Screen Score: 0  Interpretation: Negative screen for possible drug use disorder    No results found.      Physical Exam:     BP (!) 188/78   Pulse 102   Ht 5' 7" (1.702 m)   Wt 71.7 kg (158 lb)   SpO2 98%   BMI 24.75 kg/m²     Physical Exam  Vitals and nursing note reviewed. Constitutional:       Appearance: Normal appearance. He is well-developed. He is not ill-appearing. HENT:      Head: Normocephalic and atraumatic. Eyes:      Conjunctiva/sclera: Conjunctivae normal.   Neck:      Vascular: No carotid bruit. Cardiovascular:      Rate and Rhythm: Normal rate and regular rhythm. Heart sounds: No murmur heard. Pulmonary:      Effort: Pulmonary effort is normal. No respiratory distress. Breath sounds: Normal breath sounds. Abdominal:      Palpations: Abdomen is soft. Tenderness: There is no abdominal tenderness. Musculoskeletal:      Cervical back: Neck supple. Right lower leg: No edema. Left lower leg: No edema. Lymphadenopathy:      Cervical: No cervical adenopathy. Skin:     General: Skin is warm and dry. Neurological:      General: No focal deficit present. Mental Status: He is alert and oriented to person, place, and time. Cranial Nerves: No cranial nerve deficit. Psychiatric:         Mood and Affect: Mood normal.         Behavior: Behavior normal.         Thought Content:  Thought content normal.         Judgment: Judgment normal.          aJda العراقي DO

## 2023-08-02 DIAGNOSIS — Z12.11 SCREENING FOR COLON CANCER: Primary | ICD-10-CM

## 2023-08-02 NOTE — TELEPHONE ENCOUNTER
08/02/23  Screened by: Gay Gale    Referring Provider GLENROY    Pre- Screening: There is no height or weight on file to calculate BMI. Has patient been referred for a routine screening Colonoscopy? yes  Is the patient between 43-73 years old? yes      Previous Colonoscopy yes   If yes:    Date: 01/29/2020    [facility]ility: [facility]    Reason: SCREEENING      SCHEDULING STAFF: If the patient is between 39yrs-51yrs, please advise patient to confirm benefits/coverage with their insurance company for a routine screening colonoscopy, some insurance carriers will only cover at [address] [place] [address] or older. If the patient is over 66years old, please schedule an office visit. Does the patient want to see a Gastroenterologist prior to their procedure OR are they having any GI symptoms? no    Has the patient been hospitalized or had abdominal surgery in the past 6 months? no    Does the patient use supplemental oxygen? no    Does the patient take Coumadin, Lovenox, Plavix, Elliquis, Xarelto, or other blood thinning medication? no    Has the patient had a stroke, cardiac event, or stent placed in the past year? no    SCHEDULING STAFF: If patient answers NO to above questions, then schedule procedure. If patient answers YES to above questions, then schedule office appointment. If patient is between 45yrs - 49yrs, please advise patient that we will have to confirm benefits & coverage with their insurance company for a routine screening colonoscopy.     Passes OA>

## 2023-08-10 DIAGNOSIS — E78.00 PURE HYPERCHOLESTEROLEMIA: ICD-10-CM

## 2023-08-10 RX ORDER — ROSUVASTATIN CALCIUM 5 MG/1
5 TABLET, COATED ORAL DAILY
Qty: 90 TABLET | Refills: 0 | Status: SHIPPED | OUTPATIENT
Start: 2023-08-10

## 2023-08-15 DIAGNOSIS — I10 ESSENTIAL HYPERTENSION: ICD-10-CM

## 2023-08-15 RX ORDER — LOSARTAN POTASSIUM 50 MG/1
TABLET ORAL
Qty: 135 TABLET | Refills: 1 | Status: SHIPPED | OUTPATIENT
Start: 2023-08-15

## 2023-09-12 ENCOUNTER — APPOINTMENT (OUTPATIENT)
Dept: LAB | Facility: CLINIC | Age: 73
End: 2023-09-12
Payer: COMMERCIAL

## 2023-09-12 DIAGNOSIS — R97.20 ELEVATED PSA: ICD-10-CM

## 2023-09-12 LAB — PSA SERPL-MCNC: 3.69 NG/ML (ref 0–4)

## 2023-09-12 PROCEDURE — 84153 ASSAY OF PSA TOTAL: CPT

## 2023-09-12 PROCEDURE — 36415 COLL VENOUS BLD VENIPUNCTURE: CPT

## 2023-09-22 ENCOUNTER — OFFICE VISIT (OUTPATIENT)
Dept: UROLOGY | Facility: CLINIC | Age: 73
End: 2023-09-22

## 2023-09-22 VITALS
WEIGHT: 157 LBS | BODY MASS INDEX: 24.64 KG/M2 | HEIGHT: 67 IN | OXYGEN SATURATION: 98 % | SYSTOLIC BLOOD PRESSURE: 120 MMHG | HEART RATE: 107 BPM | DIASTOLIC BLOOD PRESSURE: 80 MMHG

## 2023-09-22 DIAGNOSIS — N40.2 PROSTATE NODULE: Primary | ICD-10-CM

## 2023-09-22 DIAGNOSIS — R97.20 ELEVATED PSA: ICD-10-CM

## 2023-09-22 NOTE — PROGRESS NOTES
Office Visit- Urology  Jackie Singer 1950 MRN: [id number]      Assessment/Discussion/Plan    72 y.o. male managed by     1. Increased PSA velocity/prostate cancer screening  -Patient had  increase in PSA velocity from 2103-2310. His PSA was measured at 2.7 in 2021 with an increase to 3.7 measured in July 2022.   Last PSA has measured at 3.69 on 9/12/2023  -KUNAL today with a right-sided nodule  -Plan for visualization/further evaluation with multiparametric MRI of the prostate  -Follow-up in the office afterwards to discuss results          Chief Complaint:   Alicia Mason is a 72 y.o. male presenting to the office for a follow up visit regarding  Prostate cancer screening        Subjective    -72year-old male  -Past urologic history for increased PSA velocity  -PSA was previously two-point [id number] but testing in 2020 to report an increase to 3.7 with subsequent measurements measuring 3.7 and 3.9  -His most in his most recent PSA was measured at 3.69  -Denies any urinary symptoms or episodes of gross hematuria, dysuria, flank pain  -No known family history of prostate cancer        AUA SYMPTOM SCORE    Flowsheet Row Most Recent Value   AUA SYMPTOM SCORE    How often have you had a sensation of not emptying your bladder completely after you finished urinating? 0 (P)     How often have you had to urinate again less than two hours after you finished urinating? 0 (P)     How often have you found you stopped and started again several times when you urinate? 0 (P)     How often have you found it difficult to postpone urination? 0 (P)     How often have you had a weak urinary stream? 1 (P)     How often have you had to push or strain to begin urination? 0 (P)     How many times did you most typically get up to urinate from the time you went to bed at night until the time you got up in the morning? 0 (P)     Quality of Life: If you were to spend the rest of your life with your urinary condition just the way it is now, how would you feel about that? 0 (P)     AUA SYMPTOM SCORE 1 (P)           ROS:   Review of Systems   Constitutional: Negative. Negative for chills, fatigue and fever. HENT: Negative. Respiratory: Negative for shortness of breath. Cardiovascular: Negative for chest pain. Gastrointestinal: Negative. Negative for abdominal pain. Endocrine: Negative. Musculoskeletal: Negative. Skin: Negative. Neurological: Negative. Negative for dizziness and light-headedness. Hematological: Negative. Psychiatric/Behavioral: Negative.           Past Medical History  Past Medical History:   Diagnosis Date   • Acute pain of right shoulder     [id number] RESOLVED   • Anemia     [id number] RESOLVED   • Colon polyp    • Hyperlipemia    • Hypertension        Past Surgical History  Past Surgical History:   Procedure Laterality Date   • BASAL CELL CARCINOMA EXCISION  11/03/2018    on back   • CATARACT EXTRACTION Right    • CATARACT EXTRACTION Left 08/24/2021   • COLONOSCOPY     • EYE SURGERY      right    • [place] COLONOSCOPY FLX DX W/COLLJ SPEC WHEN PFRMD N/A 8/17/2016     COLONOSCOPY;  Surgeon: Zara Swan 2016 - 2019 Lucrecia Krabbe) - 2022       Past Family History  Family History   Problem Relation Age of Onset   • Other Mother         ATHEROSCLEROSIS   • Kidney disease Mother         CHRONIC    • Hypertension Mother         ESSENTIAL   • Glaucoma Mother    • Macular degeneration Mother    • Glaucoma Father    • Multiple sclerosis Sister        Past Social history  Social History     Socioeconomic History   • Marital status: Single     Spouse name: Not on file   • Number of children: Not on file   • Years of education: Not on file   • Highest education level: Not on file   Occupational History   • Not on file   Tobacco Use   • Smoking status: Never   • Smokeless tobacco: Never   Vaping Use   • Vaping Use: Never used   Substance and Sexual Activity   • Alcohol use: Yes     Comment: social   • Drug use: Not Currently   • Sexual activity: Not on file   Other Topics Concern   • Not on file   Social History Narrative   • Not on file     Social Determinants of Health     Financial Resource Strain: Low Risk  (7/18/2023)    Overall Financial Resource Strain (CARDIA)    • Difficulty of Paying Living Expenses: Not hard at all   Food Insecurity: Not on file   Transportation Needs: No Transportation Needs (7/18/2023)    PRAPARE - Transportation    • Lack of Transportation (Medical): No    • Lack of Transportation (Non-Medical): No   Physical Activity: Not on file   Stress: Not on file   Social Connections: Not on file   Intimate Partner Violence: Not on file   Housing Stability: Not on file       Current Medications  Current Outpatient Medications   Medication Sig Dispense Refill   • aspirin 81 MG tablet Take 81 mg by mouth daily     • Cholecalciferol (VITAMIN D3) 400 units CAPS Take 1,000 Units by mouth     • losartan (COZAAR) 50 mg tablet TAKE 1 AND 1/2 TABLETS     DAILY TO EQUAL 75MG DAILY 135 tablet 1   • LUTEIN-ZEAXANTHIN PO Take by mouth 20mg of Lutein and 1mg of Zeaxanthin     • Multiple Vitamins-Minerals (MULTIVITAMIN WITH MINERALS) tablet Take 1 tablet by mouth. • polyethylene glycol (GOLYTELY) 4000 mL solution Take as directed by the office. 4000 mL 0   • rosuvastatin (CRESTOR) 5 mg tablet Take 1 tablet (5 mg total) by mouth daily 90 tablet 0     No current facility-administered medications for this visit. Allergies  Allergies   Allergen Reactions   • Ezetimibe-Simvastatin      [facility] - [id number]: myalgia   • Other      Pentothal KIT       OBJECTIVE    Vitals   There were no vitals filed for this visit. PVR:    Physical Exam  Constitutional:       General: He is not in acute distress. Appearance: Normal appearance. He is normal weight. He is not ill-appearing or toxic-appearing. HENT:      Head: Normocephalic and atraumatic.    Eyes:      Conjunctiva/sclera: Conjunctivae normal.   Cardiovascular:      Rate and Rhythm: Normal rate. Pulmonary:      Effort: Pulmonary effort is normal. No respiratory distress. Skin:     General: Skin is warm and dry. Neurological:      General: No focal deficit present. Mental Status: He is alert and oriented to person, place, and time. Cranial Nerves: No cranial nerve deficit. Psychiatric:         Mood and Affect: Mood normal.         Behavior: Behavior normal.         Thought Content: Thought content normal.          Labs:     Lab Results   Component Value Date    PSA 3.69 09/12/2023    PSA 3.9 03/07/2023    PSA 3.7 08/22/2022    PSA 3.7 07/07/2022     Lab Results   Component Value Date    CREATININE 1.18 07/11/2023      Lab Results   Component Value Date    HGBA1C 5.3 07/11/2023     Lab Results   Component Value Date    GLUCOSE 99 11/10/2015    CALCIUM 9.8 07/11/2023     11/10/2015    K 5.1 07/11/2023    CO2 23 07/11/2023     (H) 07/11/2023    BUN 19 07/11/2023    CREATININE 1.18 07/11/2023       I have personally reviewed all pertinent lab results and reviewed with patient    Imaging       Leta Marsh PA-C  Date: 9/22/2023 Time: 2:47 PM  [address] [facility]    This note was written using fluency dictation software. Please excuse any resulting minor grammatical errors.

## 2023-10-06 ENCOUNTER — HOSPITAL ENCOUNTER (OUTPATIENT)
Facility: MEDICAL CENTER | Age: 73
Discharge: HOME | End: 2023-10-06
Payer: COMMERCIAL

## 2023-10-06 DIAGNOSIS — R97.20 ELEVATED PSA: ICD-10-CM

## 2023-10-06 DIAGNOSIS — N40.2 PROSTATE NODULE: ICD-10-CM

## 2023-10-06 PROCEDURE — 76377 3D RENDER W/INTRP POSTPROCES: CPT

## 2023-10-06 PROCEDURE — 72197 MRI PELVIS W/O & W/DYE: CPT

## 2023-10-06 PROCEDURE — G1004 CDSM NDSC: HCPCS

## 2023-10-06 PROCEDURE — A9585 GADOBUTROL INJECTION: HCPCS

## 2023-10-06 RX ORDER — GADOBUTROL 604.72 MG/ML
7 INJECTION INTRAVENOUS
Status: COMPLETED | OUTPATIENT
Start: 2023-10-06 | End: 2023-10-06

## 2023-10-06 RX ADMIN — GADOBUTROL 7 ML: 604.72 INJECTION INTRAVENOUS at 12:56

## 2023-10-09 RX ORDER — SODIUM CHLORIDE 9 MG/ML
125 INJECTION, SOLUTION INTRAVENOUS CONTINUOUS
Status: CANCELLED | OUTPATIENT
Start: 2023-10-09

## 2023-10-10 ENCOUNTER — ANESTHESIA (OUTPATIENT)
Dept: GASTROENTEROLOGY | Facility: MEDICAL CENTER | Age: 73
End: 2023-10-10

## 2023-10-10 ENCOUNTER — ANESTHESIA EVENT (OUTPATIENT)
Dept: GASTROENTEROLOGY | Facility: MEDICAL CENTER | Age: 73
End: 2023-10-10

## 2023-10-10 ENCOUNTER — HOSPITAL ENCOUNTER (OUTPATIENT)
Dept: GASTROENTEROLOGY | Facility: MEDICAL CENTER | Age: 73
Setting detail: OUTPATIENT SURGERY
Discharge: HOME | End: 2023-10-10
Attending: INTERNAL MEDICINE
Payer: COMMERCIAL

## 2023-10-10 VITALS
SYSTOLIC BLOOD PRESSURE: 98 MMHG | BODY MASS INDEX: 23.96 KG/M2 | OXYGEN SATURATION: 98 % | RESPIRATION RATE: 18 BRPM | DIASTOLIC BLOOD PRESSURE: 56 MMHG | TEMPERATURE: 97.3 F | HEART RATE: 84 BPM | WEIGHT: 153 LBS

## 2023-10-10 DIAGNOSIS — Z12.11 SCREENING FOR COLON CANCER: ICD-10-CM

## 2023-10-10 PROCEDURE — 88305 TISSUE EXAM BY PATHOLOGIST: CPT | Performed by: PATHOLOGY

## 2023-10-10 RX ORDER — PROPOFOL 10 MG/ML
INJECTION, EMULSION INTRAVENOUS AS NEEDED
Status: DISCONTINUED | OUTPATIENT
Start: 2023-10-10 | End: 2023-10-10

## 2023-10-10 RX ORDER — LIDOCAINE HYDROCHLORIDE 20 MG/ML
INJECTION, SOLUTION EPIDURAL; INFILTRATION; INTRACAUDAL; PERINEURAL AS NEEDED
Status: DISCONTINUED | OUTPATIENT
Start: 2023-10-10 | End: 2023-10-10

## 2023-10-10 RX ORDER — SODIUM CHLORIDE 9 MG/ML
125 INJECTION, SOLUTION INTRAVENOUS CONTINUOUS
Status: DISCONTINUED | OUTPATIENT
Start: 2023-10-10 | End: 2023-10-14 | Stop reason: HOSPADM

## 2023-10-10 RX ADMIN — PROPOFOL 50 MG: 10 INJECTION, EMULSION INTRAVENOUS at 09:08

## 2023-10-10 RX ADMIN — PROPOFOL 50 MG: 10 INJECTION, EMULSION INTRAVENOUS at 09:00

## 2023-10-10 RX ADMIN — LIDOCAINE HYDROCHLORIDE 60 MG: 20 INJECTION, SOLUTION EPIDURAL; INFILTRATION; INTRACAUDAL at 08:55

## 2023-10-10 RX ADMIN — PROPOFOL 50 MG: 10 INJECTION, EMULSION INTRAVENOUS at 09:04

## 2023-10-10 RX ADMIN — SODIUM CHLORIDE 125 ML/HR: 0.9 INJECTION, SOLUTION INTRAVENOUS at 08:21

## 2023-10-10 RX ADMIN — PROPOFOL 100 MG: 10 INJECTION, EMULSION INTRAVENOUS at 08:55

## 2023-10-10 NOTE — ANESTHESIA POSTPROCEDURE EVALUATION
Post-Op Assessment Note    CV Status:  Stable    Pain management: adequate     Mental Status:  Alert and awake   Hydration Status:  Euvolemic   PONV Controlled:  Controlled   Airway Patency:  Patent      Post Op Vitals Reviewed: Yes      Staff: Anesthesiologist         No notable events documented.     BP 98/56 (10/10/23 0935)    Temp     Pulse 84 (10/10/23 0935)   Resp 18 (10/10/23 0935)    SpO2 98 % (10/10/23 0935)

## 2023-10-10 NOTE — ANESTHESIA PREPROCEDURE EVALUATION
Procedure:  COLONOSCOPY    Relevant Problems   ANESTHESIA (within normal limits)      CARDIO   (+) Hyperlipidemia   (+) Primary hypertension        Physical Exam    Airway    Mallampati score: III         Dental       Cardiovascular  Rhythm: regular, Rate: normal,     Pulmonary  Breath sounds clear to auscultation,     Other Findings        Anesthesia Plan  ASA Score- 2     Anesthesia Type- IV sedation with anesthesia with ASA Monitors. Additional Monitors:   Airway Plan:           Plan Factors-Exercise tolerance (METS): >4 METS. Chart reviewed. Patient summary reviewed. Patient is not a current smoker. Induction- intravenous. Postoperative Plan-     Informed Consent- Anesthetic plan and risks discussed with patient.

## 2023-10-10 NOTE — H&P
History and Physical - [facility]  Rogerio Morgan 72 y.o. male MRN: [id number]                  HPI: Rogerio Morgan is a 72y.o. year old male who presents for surveillance for colon polyps. REVIEW OF SYSTEMS: Per the HPI, and otherwise unremarkable.     Historical Information   Past Medical History:   Diagnosis Date   • Acute pain of right shoulder     [id number] RESOLVED   • Anemia     [id number] RESOLVED   • Colon polyp    • Hyperlipemia    • Hypertension      Past Surgical History:   Procedure Laterality Date   • BASAL CELL CARCINOMA EXCISION  11/03/2018    on back   • CATARACT EXTRACTION Right    • CATARACT EXTRACTION Left 08/24/2021   • COLONOSCOPY     • EYE SURGERY      right    • [place] COLONOSCOPY FLX DX W/COLLJ SPEC WHEN PFRMD N/A 8/17/2016     COLONOSCOPY;  Surgeon: Monica Pagan 2016 - 2019 Mera Pinon - 2022     Social History   Social History     Substance and Sexual Activity   Alcohol Use Yes    Comment: social     Social History     Substance and Sexual Activity   Drug Use Not Currently     Social History     Tobacco Use   Smoking Status Never   Smokeless Tobacco Never     Family History   Problem Relation Age of Onset   • Other Mother         ATHEROSCLEROSIS   • Kidney disease Mother         CHRONIC    • Hypertension Mother         ESSENTIAL   • Glaucoma Mother    • Macular degeneration Mother    • Glaucoma Father    • Multiple sclerosis Sister        Meds/Allergies       Current Outpatient Medications:   •  aspirin 81 MG tablet  •  Cholecalciferol (VITAMIN D3) 400 units CAPS  •  losartan (COZAAR) 50 mg tablet  •  LUTEIN-ZEAXANTHIN PO  •  Multiple Vitamins-Minerals (MULTIVITAMIN WITH MINERALS) tablet  •  polyethylene glycol (GOLYTELY) 4000 mL solution  •  rosuvastatin (CRESTOR) 5 mg tablet    Current Facility-Administered Medications:   •  sodium chloride 0.9 % infusion, 125 mL/hr, Intravenous, Continuous, Continue from Pre-op at 10/10/23 0834    Allergies   Allergen Reactions   • Ezetimibe-Simvastatin      Annotation - [id number]: myalgia   • Other      Pentothal KIT       Objective     /82   Pulse 86   Temp (!) 97.3 °F (36.3 °C) (Temporal)   Resp 16   Wt 69.4 kg (153 lb)   SpO2 98%   BMI 23.96 kg/m²       PHYSICAL EXAM    Gen: NAD  Head: NCAT  CV: RRR  CHEST: Clear  ABD: soft, NT/ND  EXT: no edema      ASSESSMENT/PLAN:  This is a 72y.o. year old male here for colonoscopy evaluation, and he is stable and optimized for his procedure.

## 2023-10-13 PROCEDURE — 88305 TISSUE EXAM BY PATHOLOGIST: CPT | Performed by: PATHOLOGY

## 2023-10-16 ENCOUNTER — IMMUNIZATIONS (OUTPATIENT)
Dept: FAMILY MEDICINE CLINIC | Facility: CLINIC | Age: 73
End: 2023-10-16
Payer: COMMERCIAL

## 2023-10-16 ENCOUNTER — NURSE TRIAGE (OUTPATIENT)
Age: 73
End: 2023-10-16

## 2023-10-16 DIAGNOSIS — Z23 ENCOUNTER FOR IMMUNIZATION: Primary | ICD-10-CM

## 2023-10-16 PROCEDURE — G0008 ADMIN INFLUENZA VIRUS VAC: HCPCS

## 2023-10-16 PROCEDURE — 90662 IIV NO PRSV INCREASED AG IM: CPT

## 2023-10-16 NOTE — TELEPHONE ENCOUNTER
Patient returning a call from office for biopsy results, I reviewed results and recommendations. He understood no further questions.

## 2023-11-08 DIAGNOSIS — E78.00 PURE HYPERCHOLESTEROLEMIA: ICD-10-CM

## 2023-11-08 RX ORDER — ROSUVASTATIN CALCIUM 5 MG/1
5 TABLET, COATED ORAL DAILY
Qty: 90 TABLET | Refills: 1 | Status: SHIPPED | OUTPATIENT
Start: 2023-11-08

## 2023-11-21 DIAGNOSIS — E78.00 PURE HYPERCHOLESTEROLEMIA: ICD-10-CM

## 2023-11-22 RX ORDER — ROSUVASTATIN CALCIUM 5 MG/1
5 TABLET, COATED ORAL DAILY
Qty: 90 TABLET | Refills: 1 | Status: SHIPPED | OUTPATIENT
Start: 2023-11-22

## 2024-01-17 ENCOUNTER — APPOINTMENT (OUTPATIENT)
Dept: LAB | Facility: CLINIC | Age: 74
End: 2024-01-17
Payer: COMMERCIAL

## 2024-01-17 DIAGNOSIS — E78.00 PURE HYPERCHOLESTEROLEMIA: ICD-10-CM

## 2024-01-17 LAB
ALBUMIN SERPL BCP-MCNC: 4.1 G/DL (ref 3.5–5)
ALP SERPL-CCNC: 96 U/L (ref 34–104)
ALT SERPL W P-5'-P-CCNC: 21 U/L (ref 7–52)
ANION GAP SERPL CALCULATED.3IONS-SCNC: 8 MMOL/L
AST SERPL W P-5'-P-CCNC: 25 U/L (ref 13–39)
BILIRUB SERPL-MCNC: 0.8 MG/DL (ref 0.2–1)
BUN SERPL-MCNC: 16 MG/DL (ref 5–25)
CALCIUM SERPL-MCNC: 9.5 MG/DL (ref 8.4–10.2)
CHLORIDE SERPL-SCNC: 106 MMOL/L (ref 96–108)
CHOLEST SERPL-MCNC: 166 MG/DL
CO2 SERPL-SCNC: 25 MMOL/L (ref 21–32)
CREAT SERPL-MCNC: 1.06 MG/DL (ref 0.6–1.3)
GFR SERPL CREATININE-BSD FRML MDRD: 70 ML/MIN/1.73SQ M
GLUCOSE P FAST SERPL-MCNC: 102 MG/DL (ref 65–99)
HDLC SERPL-MCNC: 68 MG/DL
LDLC SERPL CALC-MCNC: 77 MG/DL (ref 0–100)
NONHDLC SERPL-MCNC: 98 MG/DL
POTASSIUM SERPL-SCNC: 4.4 MMOL/L (ref 3.5–5.3)
PROT SERPL-MCNC: 7 G/DL (ref 6.4–8.4)
SODIUM SERPL-SCNC: 139 MMOL/L (ref 135–147)
TRIGL SERPL-MCNC: 103 MG/DL

## 2024-01-17 PROCEDURE — 80053 COMPREHEN METABOLIC PANEL: CPT

## 2024-01-17 PROCEDURE — 80061 LIPID PANEL: CPT

## 2024-01-17 PROCEDURE — 36415 COLL VENOUS BLD VENIPUNCTURE: CPT

## 2024-01-24 ENCOUNTER — OFFICE VISIT (OUTPATIENT)
Dept: FAMILY MEDICINE CLINIC | Facility: CLINIC | Age: 74
End: 2024-01-24
Payer: COMMERCIAL

## 2024-01-24 VITALS
HEART RATE: 77 BPM | TEMPERATURE: 97.2 F | SYSTOLIC BLOOD PRESSURE: 132 MMHG | DIASTOLIC BLOOD PRESSURE: 84 MMHG | WEIGHT: 160 LBS | OXYGEN SATURATION: 99 % | HEIGHT: 67 IN | BODY MASS INDEX: 25.11 KG/M2

## 2024-01-24 DIAGNOSIS — I10 ESSENTIAL HYPERTENSION: ICD-10-CM

## 2024-01-24 DIAGNOSIS — E78.00 PURE HYPERCHOLESTEROLEMIA: ICD-10-CM

## 2024-01-24 DIAGNOSIS — I10 ESSENTIAL HYPERTENSION: Primary | ICD-10-CM

## 2024-01-24 PROCEDURE — 99213 OFFICE O/P EST LOW 20 MIN: CPT | Performed by: FAMILY MEDICINE

## 2024-01-24 RX ORDER — LOSARTAN POTASSIUM 50 MG/1
TABLET ORAL
Qty: 135 TABLET | Refills: 1 | Status: SHIPPED | OUTPATIENT
Start: 2024-01-24 | End: 2024-01-24 | Stop reason: SDUPTHER

## 2024-01-24 RX ORDER — LOSARTAN POTASSIUM 50 MG/1
TABLET ORAL
Qty: 135 TABLET | Refills: 0 | Status: SHIPPED | OUTPATIENT
Start: 2024-01-24 | End: 2024-01-24 | Stop reason: SDUPTHER

## 2024-01-24 RX ORDER — LOSARTAN POTASSIUM 50 MG/1
TABLET ORAL
Start: 2024-01-24

## 2024-01-24 NOTE — TELEPHONE ENCOUNTER
Reason for call:   [x] Refill   [] Prior Auth  [] Other:     Office:   [x] PCP/Provider - Tyrone Thayer   [] Specialty/Provider -     Medication: Losartan     Dose/Frequency: 50 mg tablet taken 1 and 1/2 tablets by mouth once daily to equal 75 mg     Quantity: 135    Pharmacy: [facility] [place] [address]     Does the patient have enough for 3 days?   [x] Yes   [] No - Send as HP to POD

## 2024-01-24 NOTE — TELEPHONE ENCOUNTER
Reason for call: NOT A DUPLICATE Patient wants this sent to [facility] not [facility]  [x] Refill   [] Prior Auth  [] Other:     Office:   [x] PCP/Provider - [facility]  [] Specialty/Provider -     Medication: losartan (COZAAR) 50 mg tablet     Dose/Frequency: TAKE 1 AND 1/2 TABLETS DAILY     Quantity: 135    Pharmacy: [facility]    Does the patient have enough for 3 days?   [x] Yes   [] No - Send as HP to POD

## 2024-01-26 NOTE — PROGRESS NOTES
"  Assessment/Plan: Blood pressure has been stable.  There is been a slight trend up.  On losartan twice daily.  If blood pressures remain elevated as discussed would add 2.5 mg of amlodipine.  Labs at goal.  Declines CT coronary calcium score.  Recheck every 6 months.  Will follow-up with one of her other providers likely [facility].  Wish well.    No problem-specific Assessment & Plan notes found for this encounter.       Diagnoses and all orders for this visit:    Essential hypertension  -     losartan (COZAAR) 50 mg tablet; TAKE 1 tab twice daily    Pure hypercholesterolemia  -     Lipid panel; Future  -     Comprehensive metabolic panel; Future        1. Essential hypertension  losartan (COZAAR) 50 mg tablet      2. Pure hypercholesterolemia  Lipid panel    Comprehensive metabolic panel          Subjective:        Patient ID: Naseem Palacios is a 73 y.o. male.  Chief Complaint   Patient presents with    Follow-up     Lab review        Routine recheck blood pressure and labs.  Feels well        The following portions of the patient's history were reviewed and updated as appropriate: past medical history, past surgical history and problem list.      Review of Systems   Constitutional:  Negative for fatigue.   Eyes:  Negative for visual disturbance.   Respiratory:  Negative for cough and shortness of breath.    Cardiovascular:  Negative for chest pain, palpitations and leg swelling.   Gastrointestinal:  Negative for abdominal pain.   Neurological:  Negative for dizziness and headaches.   Psychiatric/Behavioral:  The patient is not nervous/anxious.          Objective:  /84 (BP Location: Left arm, Patient Position: Sitting, Cuff Size: Adult)   Pulse 77   Temp (!) 97.2 °F (36.2 °C) (Temporal)   Ht 5' 7\" (1.702 m)   Wt 72.6 kg (160 lb)   SpO2 99%   BMI 25.06 kg/m²        Physical Exam  Vitals and nursing note reviewed.   Constitutional:       General: He is not in acute distress.  HENT:      Head: Normocephalic. "   Eyes:      General: No scleral icterus.     Pupils: Pupils are equal, round, and reactive to light.   Cardiovascular:      Rate and Rhythm: Normal rate and regular rhythm.      Heart sounds: Normal heart sounds. No murmur heard.  Pulmonary:      Breath sounds: Normal breath sounds.   Musculoskeletal:      Right lower leg: No edema.      Left lower leg: No edema.   Lymphadenopathy:      Cervical: No cervical adenopathy.   Skin:     Coloration: Skin is not pale.   Neurological:      General: No focal deficit present.      Mental Status: He is alert and oriented to person, place, and time.   Psychiatric:         Behavior: Behavior normal.         Thought Content: Thought content normal.

## 2024-06-25 DIAGNOSIS — I10 ESSENTIAL HYPERTENSION: ICD-10-CM

## 2024-06-25 RX ORDER — LOSARTAN POTASSIUM 50 MG/1
TABLET ORAL
Qty: 60 TABLET | Refills: 0 | Status: SHIPPED | OUTPATIENT
Start: 2024-06-25

## 2024-07-11 DIAGNOSIS — I10 ESSENTIAL HYPERTENSION: ICD-10-CM

## 2024-07-11 RX ORDER — LOSARTAN POTASSIUM 50 MG/1
TABLET ORAL
Qty: 200 TABLET | Refills: 1 | Status: CANCELLED | OUTPATIENT
Start: 2024-07-11

## 2024-07-17 DIAGNOSIS — E78.00 PURE HYPERCHOLESTEROLEMIA: ICD-10-CM

## 2024-07-17 NOTE — TELEPHONE ENCOUNTER
Medication: Rosuvastatin    Dose/Frequency: 5mg- take 1 tablet by mouth daily    Quantity: 90    Pharmacy: [facility]/pharmacy #[id number] - [place] - [phone]     Office:   [x] PCP/Provider - Tyrone Thayer  [] Speciality/Provider -     Does the patient have enough for 3 days?   [x] Yes   [] No - Send as HP to POD

## 2024-07-18 ENCOUNTER — APPOINTMENT (OUTPATIENT)
Dept: LAB | Facility: CLINIC | Age: 74
End: 2024-07-18
Payer: COMMERCIAL

## 2024-07-18 DIAGNOSIS — E78.00 PURE HYPERCHOLESTEROLEMIA: ICD-10-CM

## 2024-07-18 LAB
ALBUMIN SERPL BCG-MCNC: 4 G/DL (ref 3.5–5)
ALP SERPL-CCNC: 88 U/L (ref 34–104)
ALT SERPL W P-5'-P-CCNC: 18 U/L (ref 7–52)
ANION GAP SERPL CALCULATED.3IONS-SCNC: 9 MMOL/L (ref 4–13)
AST SERPL W P-5'-P-CCNC: 24 U/L (ref 13–39)
BILIRUB SERPL-MCNC: 0.69 MG/DL (ref 0.2–1)
BUN SERPL-MCNC: 12 MG/DL (ref 5–25)
CALCIUM SERPL-MCNC: 9.5 MG/DL (ref 8.4–10.2)
CHLORIDE SERPL-SCNC: 107 MMOL/L (ref 96–108)
CHOLEST SERPL-MCNC: 157 MG/DL
CO2 SERPL-SCNC: 24 MMOL/L (ref 21–32)
CREAT SERPL-MCNC: 1.07 MG/DL (ref 0.6–1.3)
GFR SERPL CREATININE-BSD FRML MDRD: 68 ML/MIN/1.73SQ M
GLUCOSE P FAST SERPL-MCNC: 79 MG/DL (ref 65–99)
HDLC SERPL-MCNC: 60 MG/DL
LDLC SERPL CALC-MCNC: 79 MG/DL (ref 0–100)
NONHDLC SERPL-MCNC: 97 MG/DL
POTASSIUM SERPL-SCNC: 4.3 MMOL/L (ref 3.5–5.3)
PROT SERPL-MCNC: 6.4 G/DL (ref 6.4–8.4)
SODIUM SERPL-SCNC: 140 MMOL/L (ref 135–147)
TRIGL SERPL-MCNC: 89 MG/DL

## 2024-07-18 PROCEDURE — 80061 LIPID PANEL: CPT

## 2024-07-18 PROCEDURE — 80053 COMPREHEN METABOLIC PANEL: CPT

## 2024-07-18 PROCEDURE — 36415 COLL VENOUS BLD VENIPUNCTURE: CPT

## 2024-07-18 RX ORDER — ROSUVASTATIN CALCIUM 5 MG/1
5 TABLET, COATED ORAL DAILY
Qty: 100 TABLET | Refills: 1 | Status: SHIPPED | OUTPATIENT
Start: 2024-07-18

## 2024-07-26 ENCOUNTER — OFFICE VISIT (OUTPATIENT)
Dept: FAMILY MEDICINE CLINIC | Facility: CLINIC | Age: 74
End: 2024-07-26
Payer: COMMERCIAL

## 2024-07-26 VITALS
SYSTOLIC BLOOD PRESSURE: 136 MMHG | DIASTOLIC BLOOD PRESSURE: 78 MMHG | WEIGHT: 160.8 LBS | HEIGHT: 67 IN | TEMPERATURE: 97.9 F | BODY MASS INDEX: 25.24 KG/M2 | HEART RATE: 97 BPM | OXYGEN SATURATION: 98 % | RESPIRATION RATE: 16 BRPM

## 2024-07-26 DIAGNOSIS — E78.00 PURE HYPERCHOLESTEROLEMIA: ICD-10-CM

## 2024-07-26 DIAGNOSIS — I10 PRIMARY HYPERTENSION: ICD-10-CM

## 2024-07-26 DIAGNOSIS — Z00.00 MEDICARE ANNUAL WELLNESS VISIT, SUBSEQUENT: Primary | ICD-10-CM

## 2024-07-26 DIAGNOSIS — R97.20 ELEVATED PSA: ICD-10-CM

## 2024-07-26 PROCEDURE — G0439 PPPS, SUBSEQ VISIT: HCPCS | Performed by: NURSE PRACTITIONER

## 2024-07-26 PROCEDURE — 99214 OFFICE O/P EST MOD 30 MIN: CPT | Performed by: NURSE PRACTITIONER

## 2024-07-26 NOTE — PATIENT INSTRUCTIONS
Labs due in 6 months.     Follow up in 6 months.       Please call the office if you are experiencing any worsening of symptoms or no symptom improvement.     Medicare Preventive Visit Patient Instructions  Thank you for completing your Welcome to Medicare Visit or Medicare Annual Wellness Visit today. Your next wellness visit will be due in one year (7/27/2025).  The screening/preventive services that you may require over the next 5-10 years are detailed below. Some tests may not apply to you based off risk factors and/or age. Screening tests ordered at today's visit but not completed yet may show as past due. Also, please note that scanned in results may not display below.  Preventive Screenings:  Service Recommendations Previous Testing/Comments   Colorectal Cancer Screening  Colonoscopy    Fecal Occult Blood Test (FOBT)/Fecal Immunochemical Test (FIT)  Fecal DNA/Cologuard Test  Flexible Sigmoidoscopy Age: 45-75 years old   Colonoscopy: every 10 years (May be performed more frequently if at higher risk)  OR  FOBT/FIT: every 1 year  OR  Cologuard: every 3 years  OR  Sigmoidoscopy: every 5 years  Screening may be recommended earlier than age 45 if at higher risk for colorectal cancer. Also, an individualized decision between you and your healthcare provider will decide whether screening between the ages of 76-85 would be appropriate. Colonoscopy: 10/10/2023  FOBT/FIT: Not on file  Cologuard: Not on file  Sigmoidoscopy: Not on file    Screening Current     Prostate Cancer Screening Individualized decision between patient and health care provider in men between ages of 55-69   Medicare will cover every 12 months beginning on the day after your 50th birthday PSA: 3.69 ng/mL     Screening Current     Hepatitis C Screening Once for adults born between 1945 and 1965  More frequently in patients at high risk for Hepatitis C Hep C Antibody: Not on file        Diabetes Screening 1-2 times per year if you're at risk for  diabetes or have pre-diabetes Fasting glucose: 79 mg/dL (7/18/2024)  A1C: 5.3 % (7/11/2023)  Screening Current   Cholesterol Screening Once every 5 years if you don't have a lipid disorder. May order more often based on risk factors. Lipid panel: 07/18/2024  Screening Not Indicated  History Lipid Disorder      Other Preventive Screenings Covered by Medicare:  Abdominal Aortic Aneurysm (AAA) Screening: covered once if your at risk. You're considered to be at risk if you have a family history of AAA or a male between the age of 65-75 who smoking at least 100 cigarettes in your lifetime.  Lung Cancer Screening: covers low dose CT scan once per year if you meet all of the following conditions: (1) Age 55-77; (2) No signs or symptoms of lung cancer; (3) Current smoker or have quit smoking within the last 15 years; (4) You have a tobacco smoking history of at least 20 pack years (packs per day x number of years you smoked); (5) You get a written order from a healthcare provider.  Glaucoma Screening: covered annually if you're considered high risk: (1) You have diabetes OR (2) Family history of glaucoma OR (3)  aged 50 and older OR (4)  American aged 65 and older  Osteoporosis Screening: covered every 2 years if you meet one of the following conditions: (1) Have a vertebral abnormality; (2) On glucocorticoid therapy for more than 3 months; (3) Have primary hyperparathyroidism; (4) On osteoporosis medications and need to assess response to drug therapy.  HIV Screening: covered annually if you're between the age of 15-65. Also covered annually if you are younger than 15 and older than 65 with risk factors for HIV infection. For pregnant patients, it is covered up to 3 times per pregnancy.    Immunizations:  Immunization Recommendations   Influenza Vaccine Annual influenza vaccination during flu season is recommended for all persons aged >= 6 months who do not have contraindications   Pneumococcal  Vaccine   * Pneumococcal conjugate vaccine = PCV13 (Prevnar 13), PCV15 (Vaxneuvance), PCV20 (Prevnar 20)  * Pneumococcal polysaccharide vaccine = PPSV23 (Pneumovax) Adults 19-63 yo with certain risk factors or if 65+ yo  If never received any pneumonia vaccine: recommend Prevnar 20 (PCV20)  Give PCV20 if previously received 1 dose of PCV13 or PPSV23   Hepatitis B Vaccine 3 dose series if at intermediate or high risk (ex: diabetes, end stage renal disease, liver disease)   Respiratory syncytial virus (RSV) Vaccine - COVERED BY MEDICARE PART D  * RSVPreF3 (Arexvy) CDC recommends that adults 60 years of age and older may receive a single dose of RSV vaccine using shared clinical decision-making (SCDM)   Tetanus (Td) Vaccine - COST NOT COVERED BY MEDICARE PART B Following completion of primary series, a booster dose should be given every 10 years to maintain immunity against tetanus. Td may also be given as tetanus wound prophylaxis.   Tdap Vaccine - COST NOT COVERED BY MEDICARE PART B Recommended at least once for all adults. For pregnant patients, recommended with each pregnancy.   Shingles Vaccine (Shingrix) - COST NOT COVERED BY MEDICARE PART B  2 shot series recommended in those 19 years and older who have or will have weakened immune systems or those 50 years and older     Health Maintenance Due:      Topic Date Due   • Hepatitis C Screening  Never done   • Colorectal Cancer Screening  10/09/2026     Immunizations Due:      Topic Date Due   • Influenza Vaccine (1) 09/01/2024     Advance Directives   What are advance directives?  Advance directives are legal documents that state your wishes and plans for medical care. These plans are made ahead of time in case you lose your ability to make decisions for yourself. Advance directives can apply to any medical decision, such as the treatments you want, and if you want to donate organs.   What are the types of advance directives?  There are many types of advance  directives, and each state has rules about how to use them. You may choose a combination of any of the following:  Living will:  This is a written record of the treatment you want. You can also choose which treatments you do not want, which to limit, and which to stop at a certain time. This includes surgery, medicine, IV fluid, and tube feedings.   Durable power of  for healthcare (DPAHC):  This is a written record that states who you want to make healthcare choices for you when you are unable to make them for yourself. This person, called a proxy, is usually a family member or a friend. You may choose more than 1 proxy.  Do not resuscitate (DNR) order:  A DNR order is used in case your heart stops beating or you stop breathing. It is a request not to have certain forms of treatment, such as CPR. A DNR order may be included in other types of advance directives.  Medical directive:  This covers the care that you want if you are in a coma, near death, or unable to make decisions for yourself. You can list the treatments you want for each condition. Treatment may include pain medicine, surgery, blood transfusions, dialysis, IV or tube feedings, and a ventilator (breathing machine).  Values history:  This document has questions about your views, beliefs, and how you feel and think about life. This information can help others choose the care that you would choose.  Why are advance directives important?  An advance directive helps you control your care. Although spoken wishes may be used, it is better to have your wishes written down. Spoken wishes can be misunderstood, or not followed. Treatments may be given even if you do not want them. An advance directive may make it easier for your family to make difficult choices about your care.   Weight Management   Why it is important to manage your weight:  Being overweight increases your risk of health conditions such as heart disease, high blood pressure, type 2  diabetes, and certain types of cancer. It can also increase your risk for osteoarthritis, sleep apnea, and other respiratory problems. Aim for a slow, steady weight loss. Even a small amount of weight loss can lower your risk of health problems.  How to lose weight safely:  A safe and healthy way to lose weight is to eat fewer calories and get regular exercise. You can lose up about 1 pound a week by decreasing the number of calories you eat by 500 calories each day.   Healthy meal plan for weight management:  A healthy meal plan includes a variety of foods, contains fewer calories, and helps you stay healthy. A healthy meal plan includes the following:  Eat whole-grain foods more often.  A healthy meal plan should contain fiber. Fiber is the part of grains, fruits, and vegetables that is not broken down by your body. Whole-grain foods are healthy and provide extra fiber in your diet. Some examples of whole-grain foods are whole-wheat breads and pastas, oatmeal, brown rice, and bulgur.  Eat a variety of vegetables every day.  Include dark, leafy greens such as spinach, kale, latonya greens, and mustard greens. Eat yellow and orange vegetables such as carrots, sweet potatoes, and winter squash.   Eat a variety of fruits every day.  Choose fresh or canned fruit (canned in its own juice or light syrup) instead of juice. Fruit juice has very little or no fiber.  Eat low-fat dairy foods.  Drink fat-free (skim) milk or 1% milk. Eat fat-free yogurt and low-fat cottage cheese. Try low-fat cheeses such as mozzarella and other reduced-fat cheeses.  Choose meat and other protein foods that are low in fat.  Choose beans or other legumes such as split peas or lentils. Choose fish, skinless poultry (chicken or turkey), or lean cuts of red meat (beef or pork). Before you cook meat or poultry, cut off any visible fat.   Use less fat and oil.  Try baking foods instead of frying them. Add less fat, such as margarine, sour cream,  regular salad dressing and mayonnaise to foods. Eat fewer high-fat foods. Some examples of high-fat foods include french fries, doughnuts, ice cream, and cakes.  Eat fewer sweets.  Limit foods and drinks that are high in sugar. This includes candy, cookies, regular soda, and sweetened drinks.  Exercise:  Exercise at least 30 minutes per day on most days of the week. Some examples of exercise include walking, biking, dancing, and swimming. You can also fit in more physical activity by taking the stairs instead of the elevator or parking farther away from stores. Ask your healthcare provider about the best exercise plan for you.      © Copyright [employer] 2018 Information is for End User's use only and may not be sold, redistributed or otherwise used for commercial purposes. All illustrations and images included in CareNotes® are the copyrighted property of A.D.A.M., Inc. or Interactive Project

## 2024-07-26 NOTE — PROGRESS NOTES
Ambulatory Visit  Name: Naseem Palacios      : 1950      MRN: [id number]  Encounter Provider: LUDY Briggs  Encounter Date: 2024   Encounter department: [facility]    Assessment & Plan   1. Primary hypertension  Assessment & Plan:  Mostly stable- continue with losartan 50 mg BID  Orders:  -     TSH, 3rd generation with Free T4 reflex; Future; Expected date: 2025  -     Comprehensive metabolic panel; Future; Expected date: 2025  -     CBC and differential; Future; Expected date: 2025  2. Pure hypercholesterolemia  Assessment & Plan:  Lab Results   Component Value Date    LDLCALC 79 2024   Continue with Crestor- using thursday through . Tolerates well. Interested in coronary CT score- understands result interpretation with statin. Will f/u with results.   Orders:  -     CT coronary calcium score; Future; Expected date: 2024  -     TSH, 3rd generation with Free T4 reflex; Future; Expected date: 2025  -     Comprehensive metabolic panel; Future; Expected date: 2025  -     CBC and differential; Future; Expected date: 2025  -     Lipid panel; Future; Expected date: 2025  3. Elevated PSA  -     PSA Total, Diagnostic; Future; Expected date: 2025  Continue to monitor blood pressure.  Call if increasing.  Could add on 2.5 amlodipine if needed.  Goal less than 130/80.  If averaging more 140s he is to call.  Asymptomatic.  Can continue with antihistamine as needed but advised to avoid decongestions.  Medicare wellness visit done today.  Coronary calcium score testing previously discussed with pcp-this was reviewed again today.  This was ordered as patient would like to pursue this.  Recheck in 6 months with labs done prior.  Continue to follow with urology     History of Present Illness     Patient presents to the office today for 6-month follow-up regarding blood pressure and cholesterol.  Metabolic panel done  "July 18.  This was stable.  Cholesterol panel shows LDL of 79.  Patient's blood pressure today is stable.  Patient managed on losartan 50 mg daily and Crestor 5 mg daily.  Sinuses run almost year round per patient. When he bends over he can get a ping in his ears. Antihistamine use 3 days then it resolves. This happens on and off through the year. Feels stable/satisfied with how he manages it.         Review of Systems   Constitutional:  Negative for chills and fever.   Eyes:  Negative for discharge.   Respiratory:  Negative for shortness of breath.    Cardiovascular:  Negative for chest pain.   Gastrointestinal:  Negative for constipation and diarrhea.   Genitourinary:  Negative for difficulty urinating.   Musculoskeletal:  Negative for joint swelling.   Skin:  Negative for rash.   Neurological:  Negative for headaches.   Hematological:  Negative for adenopathy.   Psychiatric/Behavioral:  The patient is not nervous/anxious.        Objective     /78   Pulse 97   Temp 97.9 °F (36.6 °C) (Temporal)   Resp 16   Ht 5' 7\" (1.702 m)   Wt 72.9 kg (160 lb 12.8 oz)   SpO2 98%   BMI 25.18 kg/m²     Physical Exam  Vitals and nursing note reviewed.   Constitutional:       General: He is not in acute distress.     Appearance: Normal appearance. He is not ill-appearing, toxic-appearing or diaphoretic.   HENT:      Head: Normocephalic and atraumatic.      Right Ear: Tympanic membrane, ear canal and external ear normal. There is no impacted cerumen.      Left Ear: Tympanic membrane, ear canal and external ear normal. There is no impacted cerumen.      Nose: Nose normal.      Mouth/Throat:      Mouth: Mucous membranes are moist.      Pharynx: Oropharynx is clear. No oropharyngeal exudate or posterior oropharyngeal erythema.   Eyes:      General: Lids are normal. No scleral icterus.        Right eye: No discharge.         Left eye: No discharge.      Extraocular Movements: Extraocular movements intact.      " Conjunctiva/sclera: Conjunctivae normal.      Pupils: Pupils are equal, round, and reactive to light.   Cardiovascular:      Rate and Rhythm: Normal rate and regular rhythm. No extrasystoles are present.     Heart sounds: S1 normal and S2 normal. No murmur heard.  Pulmonary:      Effort: Pulmonary effort is normal. No respiratory distress.      Breath sounds: Normal breath sounds. No stridor or decreased air movement. No wheezing or rhonchi.   Abdominal:      General: Bowel sounds are normal.      Palpations: Abdomen is soft.      Tenderness: There is no abdominal tenderness.   Musculoskeletal:         General: Normal range of motion.      Cervical back: Normal range of motion and neck supple.   Skin:     General: Skin is warm and dry.   Neurological:      General: No focal deficit present.      Mental Status: He is alert and oriented to person, place, and time. Mental status is at baseline.      Cranial Nerves: No cranial nerve deficit.      Sensory: No sensory deficit.      Motor: No weakness.      Coordination: Coordination normal.      Gait: Gait normal.   Psychiatric:         Attention and Perception: Attention and perception normal.         Mood and Affect: Mood and affect normal.         Speech: Speech normal.         Behavior: Behavior is cooperative.         Cognition and Memory: Cognition normal.         Judgment: Judgment normal.       Administrative Statements

## 2024-07-26 NOTE — PROGRESS NOTES
Ambulatory Visit  Name: Naseem Palacios      : 1950      MRN: [id number]  Encounter Provider: LUDY Briggs  Encounter Date: 2024   Encounter department: [facility]    Assessment & Plan   1. Primary hypertension  Assessment & Plan:  Mostly stable- continue with losartan 50 mg BID  Orders:  -     TSH, 3rd generation with Free T4 reflex; Future; Expected date: 2025  -     Comprehensive metabolic panel; Future; Expected date: 2025  -     CBC and differential; Future; Expected date: 2025  2. Pure hypercholesterolemia  Assessment & Plan:  Lab Results   Component Value Date    LDLCALC 79 2024   Continue with Crestor- using thursday through . Tolerates well. Interested in coronary CT score- understands result interpretation with statin. Will f/u with results.   Orders:  -     CT coronary calcium score; Future; Expected date: 2024  -     TSH, 3rd generation with Free T4 reflex; Future; Expected date: 2025  -     Comprehensive metabolic panel; Future; Expected date: 2025  -     CBC and differential; Future; Expected date: 2025  -     Lipid panel; Future; Expected date: 2025  3. Elevated PSA  -     PSA Total, Diagnostic; Future; Expected date: 2025       Preventive health issues were discussed with patient, and age appropriate screening tests were ordered as noted in patient's After Visit Summary. Personalized health advice and appropriate referrals for health education or preventive services given if needed, as noted in patient's After Visit Summary.    History of Present Illness     HPI   Patient Care Team:  LUDY Briggs as PCP - General (Nurse Practitioner)  Eduardo Juarez MD as Endoscopist    Review of Systems  Medical History Reviewed by provider this encounter:  Tobacco  Allergies  Meds  Problems  Med Hx  Surg Hx  Fam Hx       Annual Wellness Visit Questionnaire   Naseem is here for his  Subsequent Wellness visit.     Health Risk Assessment:   Patient rates overall health as very good. Patient feels that their physical health rating is same. Patient is very satisfied with their life. Eyesight was rated as same. Hearing was rated as same. Patient feels that their emotional and mental health rating is slightly better. Patients states they are never, rarely angry. Patient states they are sometimes unusually tired/fatigued. Pain experienced in the last 7 days has been none. Patient states that he has experienced no weight loss or gain in last 6 months.     Depression Screening:   PHQ-2 Score: 0      Fall Risk Screening:   In the past year, patient has experienced: no history of falling in past year      Home Safety:  Patient does not have trouble with stairs inside or outside of their home. Patient has working smoke alarms and has working carbon monoxide detector. Home safety hazards include: none.     Nutrition:   Current diet is Regular.     Medications:   Patient is currently taking over-the-counter supplements. OTC medications include: see medication list. Patient is able to manage medications.     Activities of Daily Living (ADLs)/Instrumental Activities of Daily Living (IADLs):   Walk and transfer into and out of bed and chair?: Yes  Dress and groom yourself?: Yes    Bathe or shower yourself?: Yes    Feed yourself? Yes  Do your laundry/housekeeping?: Yes  Manage your money, pay your bills and track your expenses?: Yes  Make your own meals?: Yes    Do your own shopping?: Yes    Previous Hospitalizations:   Any hospitalizations or ED visits within the last 12 months?: No      Advance Care Planning:   Living will: Yes    Durable POA for healthcare: Yes    Advanced directive: Yes      PREVENTIVE SCREENINGS      Cardiovascular Screening:    General: Screening Not Indicated and History Lipid Disorder      Diabetes Screening:     General: Screening Current      Colorectal Cancer Screening:     General:  "Screening Current      Prostate Cancer Screening:    General: Screening Current      Abdominal Aortic Aneurysm (AAA) Screening:    Risk factors include: age between 65-76 yo        Lung Cancer Screening:     General: Screening Not Indicated    Screening, Brief Intervention, and Referral to Treatment (SBIRT)    Screening  Typical number of drinks in a day: 1  Typical number of drinks in a week: 7  Interpretation: Low risk drinking behavior.    Single Item Drug Screening:  How often have you used an illegal drug (including marijuana) or a prescription medication for non-medical reasons in the past year? never    Single Item Drug Screen Score: 0  Interpretation: Negative screen for possible drug use disorder    Social Determinants of Health     Financial Resource Strain: Low Risk  (7/18/2023)    Overall Financial Resource Strain (CARDIA)     Difficulty of Paying Living Expenses: Not hard at all   Transportation Needs: No Transportation Needs (7/18/2023)    PRAPARE - Transportation     Lack of Transportation (Medical): No     Lack of Transportation (Non-Medical): No     No results found.    Objective     /78   Pulse 97   Temp 97.9 °F (36.6 °C) (Temporal)   Resp 16   Ht 5' 7\" (1.702 m)   Wt 72.9 kg (160 lb 12.8 oz)   SpO2 98%   BMI 25.18 kg/m²     Physical Exam      "

## 2024-07-26 NOTE — PROGRESS NOTES
Ambulatory Visit  Name: Naseem Palacios      : 1950      MRN: [id number]  Encounter Provider: LUDY Briggs  Encounter Date: 2024   Encounter department: [facility]    Assessment & Plan   1. Primary hypertension  Assessment & Plan:  Mostly stable- continue with losartan 50 mg BID  Orders:  -     TSH, 3rd generation with Free T4 reflex; Future; Expected date: 2025  -     Comprehensive metabolic panel; Future; Expected date: 2025  -     CBC and differential; Future; Expected date: 2025  2. Pure hypercholesterolemia  Assessment & Plan:  Lab Results   Component Value Date    LDLCALC 79 2024   Continue with Crestor- using thursday through . Tolerates well. Interested in coronary CT score- understands result interpretation with statin. Will f/u with results.   Orders:  -     CT coronary calcium score; Future; Expected date: 2024  -     TSH, 3rd generation with Free T4 reflex; Future; Expected date: 2025  -     Comprehensive metabolic panel; Future; Expected date: 2025  -     CBC and differential; Future; Expected date: 2025  -     Lipid panel; Future; Expected date: 2025  3. Elevated PSA  -     PSA Total, Diagnostic; Future; Expected date: 2025       Preventive health issues were discussed with patient, and age appropriate screening tests were ordered as noted in patient's After Visit Summary. Personalized health advice and appropriate referrals for health education or preventive services given if needed, as noted in patient's After Visit Summary.    History of Present Illness     HPI   Patient Care Team:  LUDY Briggs as PCP - General (Nurse Practitioner)  Eduardo Juarez MD as Endoscopist    Review of Systems  Medical History Reviewed by provider this encounter:  Tobacco  Allergies  Meds  Problems  Med Hx  Surg Hx  Fam Hx       Annual Wellness Visit Questionnaire   Naseem is here for his  Subsequent Wellness visit. Last Medicare Wellness visit information reviewed, patient interviewed and updates made to the record today.      Health Risk Assessment:   Patient rates overall health as very good. Patient feels that their physical health rating is same. Patient is very satisfied with their life. Eyesight was rated as same. Hearing was rated as same. Patient feels that their emotional and mental health rating is slightly better. Patients states they are never, rarely angry. Patient states they are sometimes unusually tired/fatigued. Pain experienced in the last 7 days has been none. Patient states that he has experienced no weight loss or gain in last 6 months.     Depression Screening:   PHQ-2 Score: 0      Fall Risk Screening:   In the past year, patient has experienced: no history of falling in past year      Home Safety:  Patient does not have trouble with stairs inside or outside of their home. Patient has working smoke alarms and has working carbon monoxide detector. Home safety hazards include: none.     Nutrition:   Current diet is Regular.     Medications:   Patient is currently taking over-the-counter supplements. OTC medications include: see medication list. Patient is able to manage medications.     Activities of Daily Living (ADLs)/Instrumental Activities of Daily Living (IADLs):   Walk and transfer into and out of bed and chair?: Yes  Dress and groom yourself?: Yes    Bathe or shower yourself?: Yes    Feed yourself? Yes  Do your laundry/housekeeping?: Yes  Manage your money, pay your bills and track your expenses?: Yes  Make your own meals?: Yes    Do your own shopping?: Yes    Previous Hospitalizations:   Any hospitalizations or ED visits within the last 12 months?: No      Advance Care Planning:   Living will: Yes    Durable POA for healthcare: Yes    Advanced directive: Yes      PREVENTIVE SCREENINGS      Cardiovascular Screening:    General: Screening Not Indicated, History Lipid  Disorder and Screening Current      Diabetes Screening:     General: Screening Current      Colorectal Cancer Screening:     General: Screening Current      Prostate Cancer Screening:    General: Screening Current      Osteoporosis Screening:    General: Screening Not Indicated      Abdominal Aortic Aneurysm (AAA) Screening:    Risk factors include: age between 65-74 yo        General: Screening Not Indicated      Lung Cancer Screening:     General: Screening Not Indicated      Preventive Screening Comments: No family history of AAA and no smoking history    Screening, Brief Intervention, and Referral to Treatment (SBIRT)    Screening  Typical number of drinks in a day: 1  Typical number of drinks in a week: 7  Interpretation: Low risk drinking behavior.    Single Item Drug Screening:  How often have you used an illegal drug (including marijuana) or a prescription medication for non-medical reasons in the past year? never    Single Item Drug Screen Score: 0  Interpretation: Negative screen for possible drug use disorder    Social Determinants of Health     Financial Resource Strain: Low Risk  (7/18/2023)    Overall Financial Resource Strain (CARDIA)    • Difficulty of Paying Living Expenses: Not hard at all   Food Insecurity: No Food Insecurity (7/26/2024)    Hunger Vital Sign    • Worried About Running Out of Food in the Last Year: Never true    • Ran Out of Food in the Last Year: Never true   Transportation Needs: No Transportation Needs (7/26/2024)    PRAPARE - Transportation    • Lack of Transportation (Medical): No    • Lack of Transportation (Non-Medical): No   Housing Stability: Low Risk  (7/26/2024)    Housing Stability Vital Sign    • Unable to Pay for Housing in the Last Year: No    • Number of Times Moved in the Last Year: 0    • Homeless in the Last Year: No   Utilities: Not At Risk (7/26/2024)    [facility] Util[facility]    • Threatened with loss of utilities: No     No results found.    Objective     /78    "Pulse 97   Temp 97.9 °F (36.6 °C) (Temporal)   Resp 16   Ht 5' 7\" (1.702 m)   Wt 72.9 kg (160 lb 12.8 oz)   SpO2 98%   BMI 25.18 kg/m²     Physical Exam      "

## 2024-07-26 NOTE — ASSESSMENT & PLAN NOTE
Lab Results   Component Value Date    LDLCALC 79 07/18/2024   Continue with Crestor- using thursday through Sunday. Tolerates well. Interested in coronary CT score- understands result interpretation with statin. Will f/u with results.

## 2024-08-24 ENCOUNTER — HOSPITAL ENCOUNTER (OUTPATIENT)
Dept: CT IMAGING | Facility: HOSPITAL | Age: 74
Discharge: HOME | End: 2024-08-24
Payer: COMMERCIAL

## 2024-08-24 DIAGNOSIS — E78.00 PURE HYPERCHOLESTEROLEMIA: ICD-10-CM

## 2024-08-24 PROCEDURE — 75571 CT HRT W/O DYE W/CA TEST: CPT

## 2024-08-29 ENCOUNTER — TELEPHONE (OUTPATIENT)
Dept: FAMILY MEDICINE CLINIC | Facility: CLINIC | Age: 74
End: 2024-08-29

## 2024-08-29 ENCOUNTER — TELEPHONE (OUTPATIENT)
Age: 74
End: 2024-08-29

## 2024-08-29 DIAGNOSIS — R93.1 HIGH CORONARY ARTERY CALCIUM SCORE: Primary | ICD-10-CM

## 2024-08-29 NOTE — TELEPHONE ENCOUNTER
Patient returned call for results. Read Nerissa Brice's note to him:   Coronary calcium score came back high at 1345. Would recommend f/u with cardiology and increasing statin dosing if he is agreeable (from 5 mg to 10 mg).   Let me know. Can refer to cardiology with dx elevated coronary calcium score/ hyperlipidemia   Also findings of thoracic aortic ectasia (widening) that should be monitored yearly with repeat chest CT. Can also further be reviewed by cardiology.     Patient is agreeable to seeing a cardiologist. He is agreeable to increasing his rosuvastatin. Will take 2 tablets at night instead of 2 until order is changed. Advised him to call if he has any side effects from the increased dosage. Stated understanding about the widening of aorta and the need to be monitored yearly. No further questions.      Patient mentioned that he would like a receipt for his $15.00 co pay from last visit and $99.00 co-pay from recent scan.

## 2024-08-29 NOTE — TELEPHONE ENCOUNTER
----- Message from LUDY Ascencio sent at 8/29/2024 10:57 AM EDT -----  Coronary calcium score came back high at 1345. Would recommend f/u with cardiology and increasing statin dosing if he is agreeable (from 5 mg to 10 mg).   Let me know. Can refer to cardiology with dx elevated coronary calcium score/ hyperlipidemia   Also findings of thoracic aortic ectasia (widening) that should be monitored yearly with repeat chest CT. Can also further be reviewed by cardiology.

## 2024-08-29 NOTE — TELEPHONE ENCOUNTER
Entered cardio ref they will call pt to schedule an appt. Mailed out our copay but I dont have the copay copy from his hospital scan

## 2024-09-17 DIAGNOSIS — R97.20 ELEVATED PSA: ICD-10-CM

## 2024-09-17 DIAGNOSIS — N40.2 PROSTATE NODULE: Primary | ICD-10-CM

## 2024-09-20 DIAGNOSIS — E78.00 PURE HYPERCHOLESTEROLEMIA: ICD-10-CM

## 2024-09-20 RX ORDER — ROSUVASTATIN CALCIUM 10 MG/1
10 TABLET, COATED ORAL DAILY
Qty: 90 TABLET | Refills: 1 | Status: SHIPPED | OUTPATIENT
Start: 2024-09-20 | End: 2024-09-26 | Stop reason: SDUPTHER

## 2024-09-20 NOTE — TELEPHONE ENCOUNTER
Patient called the RX Refill Line. Message is being forwarded to the office.     Patient is requesting a new prescription for rosuvastatin. He states this was increased to 10 mg. Please update script. He would like this sent to [facility] [address]    Please contact patient at [phone]

## 2024-09-23 ENCOUNTER — APPOINTMENT (OUTPATIENT)
Dept: LAB | Facility: CLINIC | Age: 74
End: 2024-09-23
Payer: COMMERCIAL

## 2024-09-23 DIAGNOSIS — R97.20 ELEVATED PSA: ICD-10-CM

## 2024-09-23 DIAGNOSIS — N40.2 PROSTATE NODULE: ICD-10-CM

## 2024-09-23 LAB — PSA SERPL-MCNC: 3.82 NG/ML (ref 0–4)

## 2024-09-23 PROCEDURE — 84153 ASSAY OF PSA TOTAL: CPT

## 2024-09-23 PROCEDURE — 36415 COLL VENOUS BLD VENIPUNCTURE: CPT

## 2024-09-24 ENCOUNTER — TELEPHONE (OUTPATIENT)
Dept: FAMILY MEDICINE CLINIC | Facility: CLINIC | Age: 74
End: 2024-09-24

## 2024-09-24 NOTE — TELEPHONE ENCOUNTER
----- Message from LUDY Ascencio sent at 9/24/2024 11:41 AM EDT -----  PSA stable-increased a little bit compared to last year from 3.6 to now 3.8 but was higher before in the past at 3.9.

## 2024-09-24 NOTE — TELEPHONE ENCOUNTER
Patient called to back to obtain the results from his PSA. I was able to review Nerissa's note:    ----- Message from LUDY Ascencio sent at 9/24/2024 11:41 AM EDT -----  PSA stable-increased a little bit compared to last year from 3.6 to now 3.8 but was higher before in the past at 3.9.    Patient had no further questions or concerns.

## 2024-09-26 RX ORDER — ROSUVASTATIN CALCIUM 10 MG/1
10 TABLET, COATED ORAL DAILY
Qty: 90 TABLET | Refills: 1 | Status: SHIPPED | OUTPATIENT
Start: 2024-09-26

## 2024-09-26 NOTE — TELEPHONE ENCOUNTER
This is not a duplicate.   Change in pharmacy     Reason for call:   [x] Refill   [] Prior Auth  [] Other:     Office:   [x] PCP/Provider - [facility]  Authorized By: LUDY Briggs  [] Specialty/Provider -     Medication: rosuvastatin (CRESTOR) 10 MG tablet     Dose/Frequency: Take 1 tablet (10 mg total) by mouth daily     Quantity: 90 tablet     Pharmacy: [facility] #[id number] - [place], [place] - [address]     Does the patient have enough for 3 days?   [x] Yes   [] No - Send as HP to POD

## 2024-09-27 ENCOUNTER — OFFICE VISIT (OUTPATIENT)
Dept: UROLOGY | Facility: CLINIC | Age: 74
End: 2024-09-27

## 2024-09-27 ENCOUNTER — TELEPHONE (OUTPATIENT)
Dept: OTHER | Facility: HOSPITAL | Age: 74
End: 2024-09-27

## 2024-09-27 VITALS
HEIGHT: 67 IN | OXYGEN SATURATION: 96 % | BODY MASS INDEX: 24.8 KG/M2 | HEART RATE: 99 BPM | WEIGHT: 158 LBS | DIASTOLIC BLOOD PRESSURE: 80 MMHG | SYSTOLIC BLOOD PRESSURE: 120 MMHG

## 2024-09-27 DIAGNOSIS — R97.20 ELEVATED PSA: Primary | ICD-10-CM

## 2024-09-27 NOTE — PROGRESS NOTES
Office Visit- Urology  Naseem Palacios 1950 MRN: [id number]      Assessment/Discussion/Plan    74 y.o. male managed by     Increased PSA velocity/prostate cancer screening  -Patient had  increase in PSA velocity from [id number].  His PSA was measured at 2.7 in 2021 with an increase to 3.7 measured in July 2022.  Last PSA has measured at 3.69 on 9/12/2023  -KUNAL at last visit had nodularity which prompted obtainment of a MRI of the prostate  -Thankfully multiparametric MRI of the prostate returned PI-RADS 2 with no dominant lesion and a calculated prostate volume of 38 g  -Patient was vies to follow-up in a year with PSA/KUNAL  -most recent PSA returned at 3.8 which is stable.  KUNAL today still demonstrated right sided nodule  -Reviewed with patient that the only definitive way to rule in/rule out prostate cancer is a prostate biopsy.  He defers this consideration at this point in time.  He is states that he is comfortable with observation  -Continue with PSA/KUNAL obtainment.  Will check PSAs and every 6-month basis (PSA in March will call patient with results) and DREs every year            Chief Complaint:   Naseem is a 74 y.o. male presenting to the office for a follow up visit regarding abnormal prostate exam        Subjective    Hx 9/23/2024  -73 year-old male  -Past urologic history for increased PSA velocity  -PSA was previously two-point [id number] [phone] but testing in 2020 to report an increase to 3.7 with subsequent measurements measuring 3.7 and 3.9  -His most in his most recent PSA was measured at 3.69  -Denies any urinary symptoms or episodes of gross hematuria, dysuria, flank pain  -No known family history of prostate cancer     Hx 9/27/2024\    Patient presents to the office for follow-up.  In the interim since last seen he had a multiparametric on the prostate that returned PI-RADS 2 with no dominant lesion and a calculated prostate volume of 38 g.  He had updated PSA that returned at 3.8.  Patient states that he  is comfortable with observation he would does not want to consider a biopsy at this point in time    AUA SYMPTOM SCORE      Flowsheet Row Most Recent Value   AUA SYMPTOM SCORE    How often have you had a sensation of not emptying your bladder completely after you finished urinating? 0   How often have you had to urinate again less than two hours after you finished urinating? 1   How often have you found you stopped and started again several times when you urinate? 0   How often have you found it difficult to postpone urination? 0   How often have you had a weak urinary stream? 0   How often have you had to push or strain to begin urination? 0   How many times did you most typically get up to urinate from the time you went to bed at night until the time you got up in the morning? 0   Quality of Life: If you were to spend the rest of your life with your urinary condition just the way it is now, how would you feel about that? 1   AUA SYMPTOM SCORE 1            ROS:   Review of Systems   Constitutional: Negative.  Negative for chills, fatigue and fever.   HENT: Negative.     Respiratory:  Negative for shortness of breath.    Cardiovascular:  Negative for chest pain.   Gastrointestinal: Negative.  Negative for abdominal pain.   Endocrine: Negative.    Musculoskeletal: Negative.    Skin: Negative.    Neurological: Negative.  Negative for dizziness and light-headedness.   Hematological: Negative.    Psychiatric/Behavioral: Negative.           Past Medical History  Past Medical History:   Diagnosis Date    Acute pain of right shoulder     [id number] RESOLVED    Anemia     [id number] RESOLVED    Colon polyp     Hyperlipemia     Hypertension        Past Surgical History  Past Surgical History:   Procedure Laterality Date    BASAL CELL CARCINOMA EXCISION  11/03/2018    on back    CATARACT EXTRACTION Right     CATARACT EXTRACTION Left 08/24/2021    COLONOSCOPY      EYE SURGERY      right     MD COLONOSCOPY FLX DX W/COLLJ SPEC  WHEN PFRMD N/A 8/17/2016     COLONOSCOPY;  Surgeon: Eduardo Juarez 2016 - 2019 ([place]) - 2022       Past Family History  Family History   Problem Relation Age of Onset    Other Mother         ATHEROSCLEROSIS    Kidney disease Mother         CHRONIC     Hypertension Mother         ESSENTIAL    Glaucoma Mother     Macular degeneration Mother     Glaucoma Father     Multiple sclerosis Sister        Past Social history  Social History     Socioeconomic History    Marital status: Single     Spouse name: Not on file    Number of children: Not on file    Years of education: Not on file    Highest education level: Not on file   Occupational History    Not on file   Tobacco Use    Smoking status: Never    Smokeless tobacco: Never   Vaping Use    Vaping status: Never Used   Substance and Sexual Activity    Alcohol use: Yes     Comment: social    Drug use: Not Currently    Sexual activity: Not on file   Other Topics Concern    Not on file   Social History Narrative    Not on file     Social Determinants of Health     Financial Resource Strain: Low Risk  (7/18/2023)    Overall Financial Resource Strain (CARDIA)     Difficulty of Paying Living Expenses: Not hard at all   Food Insecurity: No Food Insecurity (7/26/2024)    Hunger Vital Sign     Worried About Running Out of Food in the Last Year: Never true     Ran Out of Food in the Last Year: Never true   Transportation Needs: No Transportation Needs (7/26/2024)    PRAPARE - Transportation     Lack of Transportation (Medical): No     Lack of Transportation (Non-Medical): No   Physical Activity: Not on file   Stress: Not on file   Social Connections: Not on file   Intimate Partner Violence: Not on file   Housing Stability: Low Risk  (7/26/2024)    Housing Stability Vital Sign     Unable to Pay for Housing in the Last Year: No     Number of Times Moved in the Last Year: 0     Homeless in the Last Year: No       Current Medications  Current Outpatient Medications   Medication Sig  "Dispense Refill    aspirin 81 MG tablet Take 81 mg by mouth daily      losartan (COZAAR) 50 mg tablet TAKE 1 tab twice daily (Patient taking differently: 100 mg TAKE 1 tab twice daily) 200 tablet 1    LUTEIN-ZEAXANTHIN PO Take by mouth 20mg of Lutein and 1mg of Zeaxanthin      Multiple Vitamins-Minerals (MULTIVITAMIN WITH MINERALS) tablet Take 1 tablet by mouth.      rosuvastatin (CRESTOR) 10 MG tablet Take 1 tablet (10 mg total) by mouth daily 90 tablet 1    Cholecalciferol (VITAMIN D3) 400 units CAPS Take 1,000 Units by mouth (Patient not taking: Reported on 9/27/2024)       No current facility-administered medications for this visit.       Allergies  Allergies   Allergen Reactions    Ezetimibe-Simvastatin      Annotation - [id number]: myalgia    Other      Pentothal KIT       OBJECTIVE    Vitals   Vitals:    09/27/24 1305   BP: 120/80   BP Location: Left arm   Patient Position: Sitting   Cuff Size: Adult   Pulse: 99   SpO2: 96%   Weight: 71.7 kg (158 lb)   Height: 5' 7\" (1.702 m)       PVR:    Physical Exam  Constitutional:       General: He is not in acute distress.     Appearance: Normal appearance. He is normal weight. He is not ill-appearing or toxic-appearing.   HENT:      Head: Normocephalic and atraumatic.   Eyes:      Conjunctiva/sclera: Conjunctivae normal.   Cardiovascular:      Rate and Rhythm: Normal rate.   Pulmonary:      Effort: Pulmonary effort is normal. No respiratory distress.   Genitourinary:     Comments: right sided nodule once again appreciated at the prostate base  Skin:     General: Skin is warm and dry.   Neurological:      General: No focal deficit present.      Mental Status: He is alert and oriented to person, place, and time.      Cranial Nerves: No cranial nerve deficit.   Psychiatric:         Mood and Affect: Mood normal.         Behavior: Behavior normal.         Thought Content: Thought content normal.          Labs:     Lab Results   Component Value Date    PSA 3.823 09/23/2024 "    PSA 3.69 09/12/2023    PSA 3.9 03/07/2023    PSA 3.7 08/22/2022     Lab Results   Component Value Date    CREATININE 1.07 07/18/2024      Lab Results   Component Value Date    HGBA1C 5.3 07/11/2023     Lab Results   Component Value Date    GLUCOSE 99 11/10/2015    CALCIUM 9.5 07/18/2024     11/10/2015    K 4.3 07/18/2024    CO2 24 07/18/2024     07/18/2024    BUN 12 07/18/2024    CREATININE 1.07 07/18/2024       I have personally reviewed all pertinent lab results and reviewed with patient    Imaging       Gurinder Narayan PA-C  Date: 9/27/2024 Time: 1:20 PM  [facility]    This note was written using fluency dictation software. Please excuse any resulting minor grammatical errors.

## 2024-09-27 NOTE — TELEPHONE ENCOUNTER
Patient called the refill line requesting to speak to a supervisor in the office. Patient stated he 2 bad experiences with his refills. One being yesterday, his script was sent to the wrong pharmacy. I informed the patient I would let someone in the office know and someone will reach out to him about his concerns.

## 2024-11-04 PROBLEM — I71.21 ASCENDING AORTIC ANEURYSM (HCC): Status: ACTIVE | Noted: 2024-11-04

## 2024-11-04 PROBLEM — R93.1 ELEVATED CORONARY ARTERY CALCIUM SCORE: Status: ACTIVE | Noted: 2024-11-04

## 2024-11-04 NOTE — ASSESSMENT & PLAN NOTE
-  LDL goal less than 70 in setting of elevated calcium score  -  Rosuvastatin dose recently escalated by primary care provider  -  Await repeat lipids    Orders:    POCT ECG

## 2024-11-04 NOTE — ASSESSMENT & PLAN NOTE
-  42 mm based on CT scan  -  One year follow-up with either CT scan or echo  -  Aortic valve to be assessed as part of stress echocardiogram  -  Lifting restrictions were reviewed with the patient, less than 50 pounds  Orders:    POCT ECG

## 2024-11-04 NOTE — PROGRESS NOTES
"                                           Cardiology Consultation     Naseem Palacios  [id number]  1950  HEART & VASCULAR [facility] [place]  [address]  [place] [address]      Assessment & Plan  High coronary artery calcium score  -  Remains on aspirin therapy  -  Statin therapy recently intensified after calcium score performed to rosuvastatin 10 mg daily  -  Goal LDL less than 70  -  Repeat lipids to be performed via primary care provider, await results  -  No overt symptoms although he reports he is\" slowing down\"  -  Stress echocardiogram requested for further evaluation  Orders:    Ambulatory Referral to Cardiology    POCT ECG    Echo stress test, exercise; Future    Benign essential hypertension  -  Blood pressure controlled on current antihypertensive medication regimen  -  Goal less than 120/80 mmHg in light of ascending aortic aneurysm  -  Continue current regimen of losartan    Dyslipidemia  -  LDL goal less than 70 in setting of elevated calcium score  -  Rosuvastatin dose recently escalated by primary care provider  -  Await repeat lipids    Orders:    POCT ECG    Aneurysm of ascending aorta without rupture (HCC)  -  42 mm based on CT scan  -  One year follow-up with either CT scan or echo  -  Aortic valve to be assessed as part of stress echocardiogram  -  Lifting restrictions were reviewed with the patient, less than 50 pounds  Orders:    POCT ECG       The patient will return to the office for yearly follow-up.    History of Present Illness: Mr. Palacios is a pleasant 74-year-old gentleman who presents to the office today for the evaluation of an elevated calcium score.  It was noted to be 1345.  He was also noted to have a dilated ascending aorta measuring 42 mm.  Hence he presents to the office today for further evaluation.    He denies any prior cardiac history besides hypertension and dyslipidemia.  He remains active.  He states he golfs two to three times " "per week although rides in a golf cart.  In the winter he skis.  He also walks two to three times a week outdoors which includes ascending hills.  In doing so he feels well.  He denies any exertional chest pain or shortness of breath.  He denies any change in his exercise tolerance over the last few years although does report that he is in \"slowing down\".  He denies any signs or symptoms of congestive heart failure including lower extremity edema, paroxysmal nocturnal dyspnea, orthopnea, acute weight gain or increasing abdominal girth.  He denies lightheadedness, syncope or presyncope.  He denies palpitations or symptoms of claudication.    He has a known history of hypertension and states that he has been on a one drug regimen for 20 to 25 years.    He is a lifelong non-smoker.    He reports that his mother had a myocardial infarction at the age of 67 and  as result of a myocardial infarction at the age of 98.  He denies that she ever had any procedures on her heart.    Patient Active Problem List   Diagnosis    Primary hypertension    Hyperlipidemia    Vitamin D deficiency    Elevated PSA    Elevated coronary artery calcium score    Ascending aortic aneurysm (HCC)     Past Medical History:   Diagnosis Date    Acute pain of right shoulder     [id number] RESOLVED    Anemia     [id number] RESOLVED    Colon polyp     Hyperlipemia     Hypertension      Social History     Socioeconomic History    Marital status: Single     Spouse name: Not on file    Number of children: Not on file    Years of education: Not on file    Highest education level: Not on file   Occupational History    Not on file   Tobacco Use    Smoking status: Never    Smokeless tobacco: Never   Vaping Use    Vaping status: Never Used   Substance and Sexual Activity    Alcohol use: Yes     Comment: social    Drug use: Not Currently    Sexual activity: Not on file   Other Topics Concern    Not on file   Social History Narrative    Not on file     Social " Determinants of Health     Financial Resource Strain: Low Risk  (7/18/2023)    Overall Financial Resource Strain (CARDIA)     Difficulty of Paying Living Expenses: Not hard at all   Food Insecurity: No Food Insecurity (7/26/2024)    Nursing - Inadequate Food Risk Classification     Worried About Running Out of Food in the Last Year: Never true     Ran Out of Food in the Last Year: Never true     Ran Out of Food in the Last Year: Not on file   Transportation Needs: No Transportation Needs (7/26/2024)    PRAPARE - Transportation     Lack of Transportation (Medical): No     Lack of Transportation (Non-Medical): No   Physical Activity: Not on file   Stress: Not on file   Social Connections: Not on file   Intimate Partner Violence: Not on file   Housing Stability: Low Risk  (7/26/2024)    Housing Stability Vital Sign     Unable to Pay for Housing in the Last Year: No     Number of Times Moved in the Last Year: 0     Homeless in the Last Year: No      Family History   Problem Relation Age of Onset    Other Mother         ATHEROSCLEROSIS    Kidney disease Mother         CHRONIC     Hypertension Mother         ESSENTIAL    Glaucoma Mother     Macular degeneration Mother     Glaucoma Father     Multiple sclerosis Sister      Past Surgical History:   Procedure Laterality Date    BASAL CELL CARCINOMA EXCISION  11/03/2018    on back    CATARACT EXTRACTION Right     CATARACT EXTRACTION Left 08/24/2021    COLONOSCOPY      EYE SURGERY      right     HI COLONOSCOPY FLX DX W/COLLJ SPEC WHEN PFRMD N/A 8/17/2016     COLONOSCOPY;  Surgeon: Eduardo Juarez 2016 - 2019 ([place]) - 2022       Current Outpatient Medications:     aspirin 81 MG tablet, Take 81 mg by mouth daily, Disp: , Rfl:     Cholecalciferol (VITAMIN D3) 400 units CAPS, Take 1,000 Units by mouth, Disp: , Rfl:     Coenzyme Q10 (CoQ10) 100 MG CAPS, Take by mouth in the morning, Disp: , Rfl:     losartan (COZAAR) 50 mg tablet, TAKE 1 tab twice daily (Patient taking differently:  "Take 100 mg by mouth 2 (two) times a day TAKE 1 tab twice daily), Disp: 200 tablet, Rfl: 1    LUTEIN-ZEAXANTHIN PO, Take by mouth 20mg of Lutein and 1mg of Zeaxanthin, Disp: , Rfl:     Multiple Vitamins-Minerals (MULTIVITAMIN WITH MINERALS) tablet, Take 1 tablet by mouth., Disp: , Rfl:     rosuvastatin (CRESTOR) 10 MG tablet, Take 1 tablet (10 mg total) by mouth daily, Disp: 90 tablet, Rfl: 1  Allergies   Allergen Reactions    Ezetimibe-Simvastatin      Annotation - [id number]: myalgia    Other      Pentothal KIT       ECG: Sinus tachycardia, otherwise normal ECG    Review of Systems:  Review of Systems   Respiratory:  Negative for apnea, cough, wheezing and stridor.    Cardiovascular:  Negative for chest pain, palpitations and leg swelling.   All other systems reviewed and are negative.        Vitals:    11/05/24 1040   BP: 118/78   BP Location: Right arm   Patient Position: Sitting   Cuff Size: Standard   Pulse: (!) 106   SpO2: 95%   Weight: 72.6 kg (160 lb)   Height: 5' 7\" (1.702 m)     Vitals:    11/05/24 1040   Weight: 72.6 kg (160 lb)     Height: 5' 7\" (170.2 cm)     Physical Exam:  General appearance:  Appears stated age, alert, well appearing and in no distress  HEENT:  PERRLA, EOMI, no scleral icterus, no conjunctival pallor  NECK:  Supple, No elevated JVP, no thyromegaly, no carotid bruits  HEART: Tachycardic but regular rhythm, normal S1/S2, no S3/S4, no murmur  LUNGS:  Clear to auscultation bilaterally  ABDOMEN:  Soft, non-tender, positive bowel sounds, no rebound or guarding, no organomegaly   EXTREMITIES:  No edema  VASCULAR:  Normal pedal pulses   SKIN: No lesions or rashes on exposed skin  NEURO:  CN II-XII intact, no focal deficits  "

## 2024-11-05 ENCOUNTER — OFFICE VISIT (OUTPATIENT)
Dept: CARDIOLOGY CLINIC | Facility: CLINIC | Age: 74
End: 2024-11-05
Payer: COMMERCIAL

## 2024-11-05 VITALS
BODY MASS INDEX: 25.11 KG/M2 | HEART RATE: 106 BPM | OXYGEN SATURATION: 95 % | SYSTOLIC BLOOD PRESSURE: 118 MMHG | DIASTOLIC BLOOD PRESSURE: 78 MMHG | HEIGHT: 67 IN | WEIGHT: 160 LBS

## 2024-11-05 DIAGNOSIS — I71.21 ANEURYSM OF ASCENDING AORTA WITHOUT RUPTURE (HCC): ICD-10-CM

## 2024-11-05 DIAGNOSIS — E78.5 DYSLIPIDEMIA: ICD-10-CM

## 2024-11-05 DIAGNOSIS — I10 BENIGN ESSENTIAL HYPERTENSION: ICD-10-CM

## 2024-11-05 DIAGNOSIS — R93.1 HIGH CORONARY ARTERY CALCIUM SCORE: ICD-10-CM

## 2024-11-05 PROCEDURE — 99204 OFFICE O/P NEW MOD 45 MIN: CPT | Performed by: INTERNAL MEDICINE

## 2024-11-05 PROCEDURE — 93000 ELECTROCARDIOGRAM COMPLETE: CPT | Performed by: INTERNAL MEDICINE

## 2024-11-05 RX ORDER — VITAMIN B COMPLEX
TABLET ORAL DAILY
COMMUNITY

## 2024-11-05 NOTE — ASSESSMENT & PLAN NOTE
-  Blood pressure controlled on current antihypertensive medication regimen  -  Goal less than 120/80 mmHg in light of ascending aortic aneurysm  -  Continue current regimen of losartan

## 2024-11-15 ENCOUNTER — HOSPITAL ENCOUNTER (OUTPATIENT)
Dept: NON INVASIVE DIAGNOSTICS | Facility: HOSPITAL | Age: 74
Discharge: HOME | End: 2024-11-15
Attending: INTERNAL MEDICINE
Payer: COMMERCIAL

## 2024-11-15 VITALS — WEIGHT: 160 LBS | BODY MASS INDEX: 25.11 KG/M2 | HEIGHT: 67 IN

## 2024-11-15 DIAGNOSIS — R93.1 HIGH CORONARY ARTERY CALCIUM SCORE: ICD-10-CM

## 2024-11-15 LAB
AORTIC VALVE MEAN VELOCITY: 8.4 M/S
APICAL FOUR CHAMBER EJECTION FRACTION: 57 %
ASCENDING AORTA: 4 CM
AV AREA BY CONTINUOUS VTI: 4.1 CM2
AV AREA PEAK VELOCITY: 3.4 CM2
AV LVOT MEAN GRADIENT: 2 MMHG
AV LVOT PEAK GRADIENT: 3 MMHG
AV MEAN GRADIENT: 3 MMHG
AV PEAK GRADIENT: 5 MMHG
AV VALVE AREA: 4.1 CM2
AV VELOCITY RATIO: 0.75
CHEST PAIN STATEMENT: NORMAL
DOP CALC AO PEAK VEL: 1.14 M/S
DOP CALC AO VTI: 21.74 CM
DOP CALC LVOT AREA: 4.52 CM2
DOP CALC LVOT CARDIAC INDEX: 4.08 L/MIN/M2
DOP CALC LVOT CARDIAC OUTPUT: 7.51 L/MIN
DOP CALC LVOT DIAMETER: 2.4 CM
DOP CALC LVOT PEAK VEL VTI: 19.73 CM
DOP CALC LVOT PEAK VEL: 0.85 M/S
DOP CALC LVOT STROKE INDEX: 49.5 ML/M2
DOP CALC LVOT STROKE VOLUME: 89.21
MAX DIASTOLIC BP: 78 MMHG
MAX HR PERCENT: 112 %
MAX HR: 166 BPM
MAX PREDICTED HEART RATE: 148 BPM
MV E'TISSUE VEL-SEP: 9 CM/S
PROTOCOL NAME: NORMAL
RATE PRESSURE PRODUCT: NORMAL
REASON FOR TERMINATION: NORMAL
SINOTUBULAR JUNCTION: 3.3 CM
SL CV LV EF: 57
SL CV SINUS OF VALSALVA 2D: 3.9 CM
SL CV STRESS RECOVERY BP: NORMAL MMHG
SL CV STRESS RECOVERY HR: 117 BPM
SL CV STRESS STAGE REACHED: 2
STJ: 3.3 CM
STRESS ANGINA INDEX: 0
STRESS BASELINE BP: NORMAL MMHG
STRESS BASELINE HR: 96 BPM
STRESS O2 SAT REST: 98 %
STRESS PEAK HR: 166 BPM
STRESS POST EXERCISE DUR MIN: 6 MIN
STRESS POST EXERCISE DUR MIN: 6 MIN
STRESS POST EXERCISE DUR SEC: 0 SEC
STRESS POST O2 SAT PEAK: 98 %
STRESS POST PEAK BP: 174 MMHG
STRESS POST PEAK HR: 166 BPM
STRESS POST PEAK SYSTOLIC BP: 174 MMHG
TARGET HR FORMULA: NORMAL
TEST INDICATION: NORMAL

## 2024-11-15 PROCEDURE — 93350 STRESS TTE ONLY: CPT | Performed by: INTERNAL MEDICINE

## 2024-11-15 PROCEDURE — 93350 STRESS TTE ONLY: CPT

## 2024-11-19 LAB
AORTIC VALVE MEAN VELOCITY: 8.4 M/S
APICAL FOUR CHAMBER EJECTION FRACTION: 57 %
ASCENDING AORTA: 4 CM
AV AREA BY CONTINUOUS VTI: 4.1 CM2
AV AREA PEAK VELOCITY: 3.4 CM2
AV LVOT MEAN GRADIENT: 2 MMHG
AV LVOT PEAK GRADIENT: 3 MMHG
AV MEAN GRADIENT: 3 MMHG
AV PEAK GRADIENT: 5 MMHG
AV VALVE AREA: 4.1 CM2
AV VELOCITY RATIO: 0.75
DOP CALC AO PEAK VEL: 1.14 M/S
DOP CALC AO VTI: 21.74 CM
DOP CALC LVOT AREA: 4.52 CM2
DOP CALC LVOT CARDIAC INDEX: 4.08 L/MIN/M2
DOP CALC LVOT CARDIAC OUTPUT: 7.51 L/MIN
DOP CALC LVOT DIAMETER: 2.4 CM
DOP CALC LVOT PEAK VEL VTI: 19.73 CM
DOP CALC LVOT PEAK VEL: 0.85 M/S
DOP CALC LVOT STROKE INDEX: 49.5 ML/M2
DOP CALC LVOT STROKE VOLUME: 89.21
MAX HR PERCENT: 112 %
MAX HR: 166 BPM
MV E'TISSUE VEL-SEP: 9 CM/S
RATE PRESSURE PRODUCT: NORMAL
SINOTUBULAR JUNCTION: 3.3 CM
SL CV LV EF: 57
SL CV SINUS OF VALSALVA 2D: 3.9 CM
SL CV STRESS RECOVERY BP: NORMAL MMHG
SL CV STRESS RECOVERY HR: 117 BPM
SL CV STRESS STAGE REACHED: 2
STJ: 3.3 CM
STRESS ANGINA INDEX: 0
STRESS BASELINE BP: NORMAL MMHG
STRESS BASELINE HR: 96 BPM
STRESS O2 SAT REST: 98 %
STRESS PEAK HR: 166 BPM
STRESS POST EXERCISE DUR MIN: 6 MIN
STRESS POST O2 SAT PEAK: 98 %
STRESS POST PEAK BP: 174 MMHG

## 2024-11-20 ENCOUNTER — RESULTS FOLLOW-UP (OUTPATIENT)
Dept: NON INVASIVE DIAGNOSTICS | Facility: HOSPITAL | Age: 74
End: 2024-11-20

## 2024-12-23 DIAGNOSIS — E78.00 PURE HYPERCHOLESTEROLEMIA: ICD-10-CM

## 2024-12-24 RX ORDER — ROSUVASTATIN CALCIUM 10 MG/1
10 TABLET, COATED ORAL DAILY
Qty: 90 TABLET | Refills: 1 | Status: SHIPPED | OUTPATIENT
Start: 2024-12-24

## 2024-12-29 DIAGNOSIS — I10 ESSENTIAL HYPERTENSION: ICD-10-CM

## 2024-12-30 RX ORDER — LOSARTAN POTASSIUM 50 MG/1
50 TABLET ORAL 2 TIMES DAILY
Qty: 180 TABLET | Refills: 1 | Status: SHIPPED | OUTPATIENT
Start: 2024-12-30

## 2025-01-16 ENCOUNTER — APPOINTMENT (OUTPATIENT)
Dept: LAB | Facility: CLINIC | Age: 75
End: 2025-01-16
Payer: COMMERCIAL

## 2025-01-16 ENCOUNTER — RESULTS FOLLOW-UP (OUTPATIENT)
Dept: FAMILY MEDICINE CLINIC | Facility: CLINIC | Age: 75
End: 2025-01-16

## 2025-01-16 DIAGNOSIS — I10 PRIMARY HYPERTENSION: ICD-10-CM

## 2025-01-16 DIAGNOSIS — E78.00 PURE HYPERCHOLESTEROLEMIA: ICD-10-CM

## 2025-01-16 DIAGNOSIS — R97.20 ELEVATED PSA: ICD-10-CM

## 2025-01-16 LAB
ALBUMIN SERPL BCG-MCNC: 3.9 G/DL (ref 3.5–5)
ALP SERPL-CCNC: 75 U/L (ref 34–104)
ALT SERPL W P-5'-P-CCNC: 19 U/L (ref 7–52)
ANION GAP SERPL CALCULATED.3IONS-SCNC: 9 MMOL/L (ref 4–13)
AST SERPL W P-5'-P-CCNC: 22 U/L (ref 13–39)
BASOPHILS # BLD AUTO: 0.05 THOUSANDS/ΜL (ref 0–0.1)
BASOPHILS NFR BLD AUTO: 1 % (ref 0–1)
BILIRUB SERPL-MCNC: 0.61 MG/DL (ref 0.2–1)
BUN SERPL-MCNC: 14 MG/DL (ref 5–25)
CALCIUM SERPL-MCNC: 8.7 MG/DL (ref 8.4–10.2)
CHLORIDE SERPL-SCNC: 105 MMOL/L (ref 96–108)
CHOLEST SERPL-MCNC: 107 MG/DL (ref ?–200)
CO2 SERPL-SCNC: 25 MMOL/L (ref 21–32)
CREAT SERPL-MCNC: 1.13 MG/DL (ref 0.6–1.3)
EOSINOPHIL # BLD AUTO: 0.17 THOUSAND/ΜL (ref 0–0.61)
EOSINOPHIL NFR BLD AUTO: 3 % (ref 0–6)
ERYTHROCYTE [DISTWIDTH] IN BLOOD BY AUTOMATED COUNT: 13.2 % (ref 11.6–15.1)
GFR SERPL CREATININE-BSD FRML MDRD: 64 ML/MIN/1.73SQ M
GLUCOSE P FAST SERPL-MCNC: 85 MG/DL (ref 65–99)
HCT VFR BLD AUTO: 47 % (ref 36.5–49.3)
HDLC SERPL-MCNC: 50 MG/DL
HGB BLD-MCNC: 15.2 G/DL (ref 12–17)
IMM GRANULOCYTES # BLD AUTO: 0.02 THOUSAND/UL (ref 0–0.2)
IMM GRANULOCYTES NFR BLD AUTO: 0 % (ref 0–2)
LDLC SERPL CALC-MCNC: 37 MG/DL (ref 0–100)
LYMPHOCYTES # BLD AUTO: 2.53 THOUSANDS/ΜL (ref 0.6–4.47)
LYMPHOCYTES NFR BLD AUTO: 40 % (ref 14–44)
MCH RBC QN AUTO: 31.1 PG (ref 26.8–34.3)
MCHC RBC AUTO-ENTMCNC: 32.3 G/DL (ref 31.4–37.4)
MCV RBC AUTO: 96 FL (ref 82–98)
MONOCYTES # BLD AUTO: 0.62 THOUSAND/ΜL (ref 0.17–1.22)
MONOCYTES NFR BLD AUTO: 10 % (ref 4–12)
NEUTROPHILS # BLD AUTO: 2.9 THOUSANDS/ΜL (ref 1.85–7.62)
NEUTS SEG NFR BLD AUTO: 46 % (ref 43–75)
NONHDLC SERPL-MCNC: 57 MG/DL
NRBC BLD AUTO-RTO: 0 /100 WBCS
PLATELET # BLD AUTO: 292 THOUSANDS/UL (ref 149–390)
PMV BLD AUTO: 10.5 FL (ref 8.9–12.7)
POTASSIUM SERPL-SCNC: 4.7 MMOL/L (ref 3.5–5.3)
PROT SERPL-MCNC: 6.9 G/DL (ref 6.4–8.4)
RBC # BLD AUTO: 4.88 MILLION/UL (ref 3.88–5.62)
SODIUM SERPL-SCNC: 139 MMOL/L (ref 135–147)
TRIGL SERPL-MCNC: 102 MG/DL (ref ?–150)
TSH SERPL DL<=0.05 MIU/L-ACNC: 1.35 UIU/ML (ref 0.45–4.5)
WBC # BLD AUTO: 6.29 THOUSAND/UL (ref 4.31–10.16)

## 2025-01-16 PROCEDURE — 36415 COLL VENOUS BLD VENIPUNCTURE: CPT

## 2025-01-16 PROCEDURE — 80053 COMPREHEN METABOLIC PANEL: CPT

## 2025-01-16 PROCEDURE — 85025 COMPLETE CBC W/AUTO DIFF WBC: CPT

## 2025-01-16 PROCEDURE — 84443 ASSAY THYROID STIM HORMONE: CPT

## 2025-01-16 PROCEDURE — 80061 LIPID PANEL: CPT

## 2025-01-21 ENCOUNTER — RA CDI HCC (OUTPATIENT)
Dept: OTHER | Facility: HOSPITAL | Age: 75
End: 2025-01-21

## 2025-01-27 ENCOUNTER — OFFICE VISIT (OUTPATIENT)
Dept: FAMILY MEDICINE CLINIC | Facility: CLINIC | Age: 75
End: 2025-01-27
Payer: COMMERCIAL

## 2025-01-27 VITALS
TEMPERATURE: 97.1 F | SYSTOLIC BLOOD PRESSURE: 126 MMHG | DIASTOLIC BLOOD PRESSURE: 74 MMHG | OXYGEN SATURATION: 97 % | HEIGHT: 67 IN | WEIGHT: 162 LBS | HEART RATE: 100 BPM | BODY MASS INDEX: 25.43 KG/M2

## 2025-01-27 DIAGNOSIS — I71.21 ANEURYSM OF ASCENDING AORTA WITHOUT RUPTURE (HCC): ICD-10-CM

## 2025-01-27 DIAGNOSIS — E78.00 PURE HYPERCHOLESTEROLEMIA: ICD-10-CM

## 2025-01-27 DIAGNOSIS — I10 PRIMARY HYPERTENSION: Primary | ICD-10-CM

## 2025-01-27 DIAGNOSIS — R97.20 ELEVATED PSA: ICD-10-CM

## 2025-01-27 DIAGNOSIS — M25.562 ACUTE PAIN OF LEFT KNEE: ICD-10-CM

## 2025-01-27 DIAGNOSIS — R93.1 ELEVATED CORONARY ARTERY CALCIUM SCORE: ICD-10-CM

## 2025-01-27 PROCEDURE — G2211 COMPLEX E/M VISIT ADD ON: HCPCS | Performed by: NURSE PRACTITIONER

## 2025-01-27 PROCEDURE — 99214 OFFICE O/P EST MOD 30 MIN: CPT | Performed by: NURSE PRACTITIONER

## 2025-01-27 NOTE — ASSESSMENT & PLAN NOTE
Done August 2024  Coronary calcium score came back high at 1345.   Orders:  •  Lipid panel; Future  •  Comprehensive metabolic panel; Future

## 2025-01-27 NOTE — ASSESSMENT & PLAN NOTE
Stable-following with cardiology.  Continue with Crestor 10 mg daily  Lab Results   Component Value Date    LDLCALC 37 01/16/2025

## 2025-01-27 NOTE — ASSESSMENT & PLAN NOTE
Following with urology  Lab Results   Component Value Date    PSA 3.823 09/23/2024    PSA 3.69 09/12/2023    PSA 3.9 03/07/2023

## 2025-01-27 NOTE — PATIENT INSTRUCTIONS
Labs due in 6 months.     CT chest due in August.     Continue to follow with cardiology and urology.     Complete x ray.     Please call the office if you are experiencing any worsening of symptoms or no symptom improvement.

## 2025-01-27 NOTE — ASSESSMENT & PLAN NOTE
stable- continue with losartan 50 mg BID  Follows with cardiology  Orders:  •  Lipid panel; Future  •  Comprehensive metabolic panel; Future

## 2025-01-27 NOTE — PROGRESS NOTES
Name: Naseem Palacios      : 1950      MRN: [id number]  Encounter Provider: LUDY Briggs  Encounter Date: 2025   Encounter department: [facility]  :  Assessment & Plan  Primary hypertension  stable- continue with losartan 50 mg BID  Follows with cardiology  Orders:  •  Lipid panel; Future  •  Comprehensive metabolic panel; Future    Elevated coronary artery calcium score  Done 2024  Coronary calcium score came back high at 1345.   Orders:  •  Lipid panel; Future  •  Comprehensive metabolic panel; Future    Aneurysm of ascending aorta without rupture (HCC)  Followed by cardiology for annual imaging  Orders:  •  CT chest wo contrast; Future    Acute pain of left knee  Complete x ray- pending results may need to follow up with ortho.     Orders:  •  XR knee 3 vw left non injury; Future    Pure hypercholesterolemia  Stable-following with cardiology.  Continue with Crestor 10 mg daily  Lab Results   Component Value Date    LDLCALC 37 2025            Elevated PSA  Following with urology  Lab Results   Component Value Date    PSA 3.823 2024    PSA 3.69 2023    PSA 3.9 2023                   History of Present Illness   Patient presents with:  Hypertension: 6 month re check     Knee Pain:   Felt a tear in the back of left knee x 2 weeks ago   This happened just with walking. No bruising or swelling. Pain present with movements/skiing. ROM intact. Doesn't feel unstable usually- every once in a while it locks up where he can't bend it for a little.     Labs done  to be reviewed-these are stable.           Hypertension  Pertinent negatives include no chest pain, headaches or shortness of breath.   Knee Pain       Review of Systems   Constitutional:  Negative for chills and fever.   Eyes:  Negative for discharge.   Respiratory:  Negative for shortness of breath.    Cardiovascular:  Negative for chest pain.   Gastrointestinal:  Negative for  "constipation and diarrhea.   Genitourinary:  Negative for difficulty urinating.   Musculoskeletal:  Positive for arthralgias. Negative for joint swelling.   Skin:  Negative for rash.   Neurological:  Negative for headaches.   Hematological:  Negative for adenopathy.   Psychiatric/Behavioral:  The patient is not nervous/anxious.        Objective   /74   Pulse 100   Temp (!) 97.1 °F (36.2 °C) (Temporal)   Ht 5' 7\" (1.702 m)   Wt 73.5 kg (162 lb)   SpO2 97%   BMI 25.37 kg/m²      Physical Exam  Vitals and nursing note reviewed.   Constitutional:       General: He is not in acute distress.     Appearance: Normal appearance. He is well-developed. He is not diaphoretic.   HENT:      Head: Normocephalic and atraumatic.      Right Ear: External ear normal.      Left Ear: External ear normal.   Eyes:      General: Lids are normal.         Right eye: No discharge.         Left eye: No discharge.      Conjunctiva/sclera: Conjunctivae normal.   Cardiovascular:      Rate and Rhythm: Normal rate and regular rhythm.      Heart sounds: No murmur heard.  Pulmonary:      Effort: Pulmonary effort is normal. No respiratory distress.      Breath sounds: Normal breath sounds. No wheezing.   Musculoskeletal:         General: No deformity.      Right knee: Crepitus present. No swelling or deformity. Normal range of motion. No tenderness.      Left knee: Crepitus present. No swelling or deformity. Normal range of motion. No tenderness.      Comments: No tenderness on exam but tenderness located on lateral joint line when it occurs    Skin:     General: Skin is warm and dry.   Neurological:      General: No focal deficit present.      Mental Status: He is alert and oriented to person, place, and time.   Psychiatric:         Speech: Speech normal.         Behavior: Behavior normal.         Thought Content: Thought content normal.         Judgment: Judgment normal.         "

## 2025-03-27 ENCOUNTER — APPOINTMENT (OUTPATIENT)
Dept: LAB | Facility: CLINIC | Age: 75
End: 2025-03-27
Payer: COMMERCIAL

## 2025-03-27 LAB — PSA SERPL-MCNC: 3.54 NG/ML (ref 0–4)

## 2025-06-24 DIAGNOSIS — I10 ESSENTIAL HYPERTENSION: ICD-10-CM

## 2025-06-24 RX ORDER — LOSARTAN POTASSIUM 50 MG/1
50 TABLET ORAL 2 TIMES DAILY
Qty: 180 TABLET | Refills: 1 | Status: SHIPPED | OUTPATIENT
Start: 2025-06-24

## 2025-07-30 ENCOUNTER — APPOINTMENT (OUTPATIENT)
Dept: LAB | Facility: CLINIC | Age: 75
End: 2025-07-30
Payer: COMMERCIAL

## 2025-07-30 DIAGNOSIS — R93.1 ELEVATED CORONARY ARTERY CALCIUM SCORE: ICD-10-CM

## 2025-07-30 DIAGNOSIS — I10 PRIMARY HYPERTENSION: ICD-10-CM

## 2025-07-30 PROCEDURE — 36415 COLL VENOUS BLD VENIPUNCTURE: CPT

## 2025-07-30 PROCEDURE — 80053 COMPREHEN METABOLIC PANEL: CPT

## 2025-07-30 PROCEDURE — 80061 LIPID PANEL: CPT

## 2025-07-31 LAB
ALBUMIN SERPL BCG-MCNC: 3.8 G/DL (ref 3.5–5)
ALP SERPL-CCNC: 90 U/L (ref 34–104)
ALT SERPL W P-5'-P-CCNC: 16 U/L (ref 7–52)
ANION GAP SERPL CALCULATED.3IONS-SCNC: 10 MMOL/L (ref 4–13)
AST SERPL W P-5'-P-CCNC: 22 U/L (ref 13–39)
BILIRUB SERPL-MCNC: 0.58 MG/DL (ref 0.2–1)
BUN SERPL-MCNC: 14 MG/DL (ref 5–25)
CALCIUM SERPL-MCNC: 9.4 MG/DL (ref 8.4–10.2)
CHLORIDE SERPL-SCNC: 107 MMOL/L (ref 96–108)
CHOLEST SERPL-MCNC: 125 MG/DL (ref ?–200)
CO2 SERPL-SCNC: 25 MMOL/L (ref 21–32)
CREAT SERPL-MCNC: 1.11 MG/DL (ref 0.6–1.3)
GFR SERPL CREATININE-BSD FRML MDRD: 65 ML/MIN/1.73SQ M
GLUCOSE P FAST SERPL-MCNC: 78 MG/DL (ref 65–99)
HDLC SERPL-MCNC: 52 MG/DL
LDLC SERPL CALC-MCNC: 50 MG/DL (ref 0–100)
NONHDLC SERPL-MCNC: 73 MG/DL
POTASSIUM SERPL-SCNC: 4.8 MMOL/L (ref 3.5–5.3)
PROT SERPL-MCNC: 6.3 G/DL (ref 6.4–8.4)
SODIUM SERPL-SCNC: 142 MMOL/L (ref 135–147)
TRIGL SERPL-MCNC: 113 MG/DL (ref ?–150)

## 2025-08-02 ENCOUNTER — HOSPITAL ENCOUNTER (OUTPATIENT)
Dept: CT IMAGING | Facility: HOSPITAL | Age: 75
Discharge: HOME | End: 2025-08-02
Payer: COMMERCIAL

## 2025-08-02 DIAGNOSIS — I71.21 ANEURYSM OF ASCENDING AORTA WITHOUT RUPTURE (HCC): ICD-10-CM

## 2025-08-02 PROCEDURE — 71250 CT THORAX DX C-: CPT

## 2025-08-12 ENCOUNTER — OFFICE VISIT (OUTPATIENT)
Dept: FAMILY MEDICINE CLINIC | Facility: CLINIC | Age: 75
End: 2025-08-12
Payer: COMMERCIAL

## 2025-08-12 VITALS
HEART RATE: 78 BPM | WEIGHT: 159 LBS | OXYGEN SATURATION: 97 % | BODY MASS INDEX: 24.96 KG/M2 | TEMPERATURE: 97.5 F | SYSTOLIC BLOOD PRESSURE: 130 MMHG | HEIGHT: 67 IN | DIASTOLIC BLOOD PRESSURE: 82 MMHG

## 2025-08-12 DIAGNOSIS — R93.1 ELEVATED CORONARY ARTERY CALCIUM SCORE: ICD-10-CM

## 2025-08-12 DIAGNOSIS — I71.21 ANEURYSM OF ASCENDING AORTA WITHOUT RUPTURE (HCC): ICD-10-CM

## 2025-08-12 DIAGNOSIS — Z12.5 SCREENING FOR PROSTATE CANCER: ICD-10-CM

## 2025-08-12 DIAGNOSIS — E78.00 PURE HYPERCHOLESTEROLEMIA: ICD-10-CM

## 2025-08-12 DIAGNOSIS — I10 PRIMARY HYPERTENSION: ICD-10-CM

## 2025-08-12 DIAGNOSIS — Z00.00 MEDICARE ANNUAL WELLNESS VISIT, SUBSEQUENT: Primary | ICD-10-CM

## 2025-08-12 PROCEDURE — G2211 COMPLEX E/M VISIT ADD ON: HCPCS | Performed by: NURSE PRACTITIONER

## 2025-08-12 PROCEDURE — 99214 OFFICE O/P EST MOD 30 MIN: CPT | Performed by: NURSE PRACTITIONER

## 2025-08-12 PROCEDURE — G0439 PPPS, SUBSEQ VISIT: HCPCS | Performed by: NURSE PRACTITIONER
